# Patient Record
Sex: MALE | Race: WHITE | NOT HISPANIC OR LATINO | Employment: FULL TIME | URBAN - METROPOLITAN AREA
[De-identification: names, ages, dates, MRNs, and addresses within clinical notes are randomized per-mention and may not be internally consistent; named-entity substitution may affect disease eponyms.]

---

## 2017-01-10 ENCOUNTER — ALLSCRIPTS OFFICE VISIT (OUTPATIENT)
Dept: OTHER | Facility: OTHER | Age: 57
End: 2017-01-10

## 2017-01-10 DIAGNOSIS — M84.362A STRESS FRACTURE OF LEFT TIBIA: ICD-10-CM

## 2017-01-10 DIAGNOSIS — S93.492A SPRAIN OF OTHER LIGAMENT OF LEFT ANKLE, INITIAL ENCOUNTER: ICD-10-CM

## 2017-01-10 DIAGNOSIS — S92.042G: ICD-10-CM

## 2017-01-10 DIAGNOSIS — D75.89 OTHER SPECIFIED DISEASES OF BLOOD AND BLOOD-FORMING ORGANS: ICD-10-CM

## 2017-02-20 ENCOUNTER — GENERIC CONVERSION - ENCOUNTER (OUTPATIENT)
Dept: OTHER | Facility: OTHER | Age: 57
End: 2017-02-20

## 2017-02-20 ENCOUNTER — HOSPITAL ENCOUNTER (OUTPATIENT)
Dept: MRI IMAGING | Facility: HOSPITAL | Age: 57
Discharge: HOME | End: 2017-02-20
Attending: INTERNAL MEDICINE
Payer: OTHER MISCELLANEOUS

## 2017-02-20 DIAGNOSIS — S92.042G: ICD-10-CM

## 2017-02-20 DIAGNOSIS — M84.362A STRESS FRACTURE OF LEFT TIBIA: ICD-10-CM

## 2017-02-20 DIAGNOSIS — S93.492A SPRAIN OF OTHER LIGAMENT OF LEFT ANKLE, INITIAL ENCOUNTER: ICD-10-CM

## 2017-02-20 DIAGNOSIS — D75.89 OTHER SPECIFIED DISEASES OF BLOOD AND BLOOD-FORMING ORGANS: ICD-10-CM

## 2017-02-20 PROCEDURE — 73721 MRI JNT OF LWR EXTRE W/O DYE: CPT

## 2017-02-24 ENCOUNTER — ALLSCRIPTS OFFICE VISIT (OUTPATIENT)
Dept: OTHER | Facility: OTHER | Age: 57
End: 2017-02-24

## 2017-02-24 DIAGNOSIS — M72.2 PLANTAR FASCIAL FIBROMATOSIS: ICD-10-CM

## 2017-02-24 DIAGNOSIS — M79.672 PAIN OF LEFT FOOT: ICD-10-CM

## 2017-02-24 DIAGNOSIS — M84.362D STRESS FRACTURE OF LEFT TIBIA WITH ROUTINE HEALING: ICD-10-CM

## 2017-02-24 DIAGNOSIS — D75.89 OTHER SPECIFIED DISEASES OF BLOOD AND BLOOD-FORMING ORGANS: ICD-10-CM

## 2017-02-24 DIAGNOSIS — S93.492D SPRAIN OF OTHER LIGAMENT OF LEFT ANKLE, SUBSEQUENT ENCOUNTER: ICD-10-CM

## 2017-02-24 DIAGNOSIS — S92.042G: ICD-10-CM

## 2017-03-13 ENCOUNTER — APPOINTMENT (OUTPATIENT)
Dept: PHYSICAL THERAPY | Age: 57
End: 2017-03-13
Payer: OTHER MISCELLANEOUS

## 2017-03-13 ENCOUNTER — GENERIC CONVERSION - ENCOUNTER (OUTPATIENT)
Dept: OTHER | Facility: OTHER | Age: 57
End: 2017-03-13

## 2017-03-13 DIAGNOSIS — M79.672 PAIN OF LEFT FOOT: ICD-10-CM

## 2017-03-13 DIAGNOSIS — D75.89 OTHER SPECIFIED DISEASES OF BLOOD AND BLOOD-FORMING ORGANS: ICD-10-CM

## 2017-03-13 DIAGNOSIS — S92.042G: ICD-10-CM

## 2017-03-13 DIAGNOSIS — M72.2 PLANTAR FASCIAL FIBROMATOSIS: ICD-10-CM

## 2017-03-13 DIAGNOSIS — S93.492D SPRAIN OF OTHER LIGAMENT OF LEFT ANKLE, SUBSEQUENT ENCOUNTER: ICD-10-CM

## 2017-03-13 DIAGNOSIS — M84.362D STRESS FRACTURE OF LEFT TIBIA WITH ROUTINE HEALING: ICD-10-CM

## 2017-03-13 PROCEDURE — 97140 MANUAL THERAPY 1/> REGIONS: CPT

## 2017-03-13 PROCEDURE — 97014 ELECTRIC STIMULATION THERAPY: CPT

## 2017-03-13 PROCEDURE — 97162 PT EVAL MOD COMPLEX 30 MIN: CPT

## 2017-03-16 ENCOUNTER — APPOINTMENT (OUTPATIENT)
Dept: PHYSICAL THERAPY | Age: 57
End: 2017-03-16
Payer: OTHER MISCELLANEOUS

## 2017-03-16 PROCEDURE — 97014 ELECTRIC STIMULATION THERAPY: CPT

## 2017-03-16 PROCEDURE — 97110 THERAPEUTIC EXERCISES: CPT

## 2017-03-23 ENCOUNTER — APPOINTMENT (OUTPATIENT)
Dept: PHYSICAL THERAPY | Age: 57
End: 2017-03-23
Payer: OTHER MISCELLANEOUS

## 2017-03-24 ENCOUNTER — APPOINTMENT (OUTPATIENT)
Dept: PHYSICAL THERAPY | Age: 57
End: 2017-03-24
Payer: OTHER MISCELLANEOUS

## 2017-03-24 PROCEDURE — 97112 NEUROMUSCULAR REEDUCATION: CPT

## 2017-03-24 PROCEDURE — 97110 THERAPEUTIC EXERCISES: CPT

## 2017-03-24 PROCEDURE — 97014 ELECTRIC STIMULATION THERAPY: CPT

## 2017-03-28 ENCOUNTER — APPOINTMENT (OUTPATIENT)
Dept: PHYSICAL THERAPY | Age: 57
End: 2017-03-28
Payer: OTHER MISCELLANEOUS

## 2017-03-28 PROCEDURE — 97140 MANUAL THERAPY 1/> REGIONS: CPT

## 2017-03-28 PROCEDURE — 97110 THERAPEUTIC EXERCISES: CPT

## 2017-03-28 PROCEDURE — 97014 ELECTRIC STIMULATION THERAPY: CPT

## 2017-03-30 ENCOUNTER — APPOINTMENT (OUTPATIENT)
Dept: PHYSICAL THERAPY | Age: 57
End: 2017-03-30
Payer: OTHER MISCELLANEOUS

## 2017-03-30 PROCEDURE — 97014 ELECTRIC STIMULATION THERAPY: CPT

## 2017-03-30 PROCEDURE — 97112 NEUROMUSCULAR REEDUCATION: CPT

## 2017-03-30 PROCEDURE — 97140 MANUAL THERAPY 1/> REGIONS: CPT

## 2017-03-30 PROCEDURE — 97110 THERAPEUTIC EXERCISES: CPT

## 2017-04-04 ENCOUNTER — APPOINTMENT (OUTPATIENT)
Dept: PHYSICAL THERAPY | Age: 57
End: 2017-04-04
Payer: OTHER MISCELLANEOUS

## 2017-04-04 PROCEDURE — 97014 ELECTRIC STIMULATION THERAPY: CPT

## 2017-04-04 PROCEDURE — 97112 NEUROMUSCULAR REEDUCATION: CPT

## 2017-04-04 PROCEDURE — 97140 MANUAL THERAPY 1/> REGIONS: CPT

## 2017-04-04 PROCEDURE — 97110 THERAPEUTIC EXERCISES: CPT

## 2017-04-06 ENCOUNTER — APPOINTMENT (OUTPATIENT)
Dept: PHYSICAL THERAPY | Age: 57
End: 2017-04-06
Payer: OTHER MISCELLANEOUS

## 2017-04-06 ENCOUNTER — GENERIC CONVERSION - ENCOUNTER (OUTPATIENT)
Dept: OTHER | Facility: OTHER | Age: 57
End: 2017-04-06

## 2017-04-06 PROCEDURE — 97140 MANUAL THERAPY 1/> REGIONS: CPT

## 2017-04-06 PROCEDURE — 97110 THERAPEUTIC EXERCISES: CPT

## 2017-04-07 ENCOUNTER — ALLSCRIPTS OFFICE VISIT (OUTPATIENT)
Dept: OTHER | Facility: OTHER | Age: 57
End: 2017-04-07

## 2017-04-11 ENCOUNTER — APPOINTMENT (OUTPATIENT)
Dept: PHYSICAL THERAPY | Age: 57
End: 2017-04-11
Payer: OTHER MISCELLANEOUS

## 2017-04-11 PROCEDURE — 97110 THERAPEUTIC EXERCISES: CPT

## 2017-04-11 PROCEDURE — 97140 MANUAL THERAPY 1/> REGIONS: CPT

## 2017-04-13 ENCOUNTER — APPOINTMENT (OUTPATIENT)
Dept: PHYSICAL THERAPY | Age: 57
End: 2017-04-13
Payer: OTHER MISCELLANEOUS

## 2017-04-13 PROCEDURE — 97014 ELECTRIC STIMULATION THERAPY: CPT

## 2017-04-13 PROCEDURE — 97112 NEUROMUSCULAR REEDUCATION: CPT

## 2017-04-13 PROCEDURE — 97110 THERAPEUTIC EXERCISES: CPT

## 2017-04-18 ENCOUNTER — APPOINTMENT (OUTPATIENT)
Dept: PHYSICAL THERAPY | Age: 57
End: 2017-04-18
Payer: OTHER MISCELLANEOUS

## 2017-04-18 PROCEDURE — 97014 ELECTRIC STIMULATION THERAPY: CPT

## 2017-04-18 PROCEDURE — 97110 THERAPEUTIC EXERCISES: CPT

## 2017-04-20 ENCOUNTER — APPOINTMENT (OUTPATIENT)
Dept: PHYSICAL THERAPY | Age: 57
End: 2017-04-20
Payer: OTHER MISCELLANEOUS

## 2017-04-20 PROCEDURE — 97110 THERAPEUTIC EXERCISES: CPT

## 2017-04-20 PROCEDURE — 97014 ELECTRIC STIMULATION THERAPY: CPT

## 2017-04-20 PROCEDURE — 97112 NEUROMUSCULAR REEDUCATION: CPT

## 2017-04-25 ENCOUNTER — APPOINTMENT (OUTPATIENT)
Dept: PHYSICAL THERAPY | Age: 57
End: 2017-04-25
Payer: OTHER MISCELLANEOUS

## 2017-04-25 PROCEDURE — 97140 MANUAL THERAPY 1/> REGIONS: CPT

## 2017-04-25 PROCEDURE — 97014 ELECTRIC STIMULATION THERAPY: CPT

## 2017-04-25 PROCEDURE — 97110 THERAPEUTIC EXERCISES: CPT

## 2017-04-27 ENCOUNTER — APPOINTMENT (OUTPATIENT)
Dept: PHYSICAL THERAPY | Age: 57
End: 2017-04-27
Payer: OTHER MISCELLANEOUS

## 2017-04-27 PROCEDURE — 97112 NEUROMUSCULAR REEDUCATION: CPT

## 2017-04-27 PROCEDURE — 97014 ELECTRIC STIMULATION THERAPY: CPT

## 2017-04-27 PROCEDURE — 97110 THERAPEUTIC EXERCISES: CPT

## 2017-04-27 PROCEDURE — 97140 MANUAL THERAPY 1/> REGIONS: CPT

## 2017-05-02 ENCOUNTER — APPOINTMENT (OUTPATIENT)
Dept: PHYSICAL THERAPY | Age: 57
End: 2017-05-02
Payer: OTHER MISCELLANEOUS

## 2017-05-02 ENCOUNTER — GENERIC CONVERSION - ENCOUNTER (OUTPATIENT)
Dept: OTHER | Facility: OTHER | Age: 57
End: 2017-05-02

## 2017-05-02 PROCEDURE — 97110 THERAPEUTIC EXERCISES: CPT

## 2017-05-02 PROCEDURE — 97112 NEUROMUSCULAR REEDUCATION: CPT

## 2017-05-02 PROCEDURE — 97140 MANUAL THERAPY 1/> REGIONS: CPT

## 2017-05-04 ENCOUNTER — APPOINTMENT (OUTPATIENT)
Dept: PHYSICAL THERAPY | Age: 57
End: 2017-05-04
Payer: OTHER MISCELLANEOUS

## 2017-05-04 PROCEDURE — 97112 NEUROMUSCULAR REEDUCATION: CPT

## 2017-05-04 PROCEDURE — 97140 MANUAL THERAPY 1/> REGIONS: CPT

## 2017-05-04 PROCEDURE — 97110 THERAPEUTIC EXERCISES: CPT

## 2017-05-09 ENCOUNTER — APPOINTMENT (OUTPATIENT)
Dept: PHYSICAL THERAPY | Age: 57
End: 2017-05-09
Payer: OTHER MISCELLANEOUS

## 2017-05-11 ENCOUNTER — APPOINTMENT (OUTPATIENT)
Dept: PHYSICAL THERAPY | Age: 57
End: 2017-05-11
Payer: OTHER MISCELLANEOUS

## 2017-05-11 PROCEDURE — 97110 THERAPEUTIC EXERCISES: CPT

## 2017-06-08 ENCOUNTER — ALLSCRIPTS OFFICE VISIT (OUTPATIENT)
Dept: OTHER | Facility: OTHER | Age: 57
End: 2017-06-08

## 2017-06-08 ENCOUNTER — TRANSCRIBE ORDERS (OUTPATIENT)
Dept: ADMINISTRATIVE | Facility: HOSPITAL | Age: 57
End: 2017-06-08

## 2017-06-08 DIAGNOSIS — R20.8 BURNING SENSATION: ICD-10-CM

## 2017-06-08 DIAGNOSIS — S92.042G: Primary | ICD-10-CM

## 2017-06-14 ENCOUNTER — HOSPITAL ENCOUNTER (OUTPATIENT)
Dept: RADIOLOGY | Age: 57
Discharge: HOME | End: 2017-06-14
Payer: OTHER MISCELLANEOUS

## 2017-06-14 DIAGNOSIS — S92.042G: ICD-10-CM

## 2017-06-14 DIAGNOSIS — R20.8 BURNING SENSATION: ICD-10-CM

## 2017-06-14 PROCEDURE — 73721 MRI JNT OF LWR EXTRE W/O DYE: CPT

## 2017-07-06 ENCOUNTER — GENERIC CONVERSION - ENCOUNTER (OUTPATIENT)
Dept: OTHER | Facility: OTHER | Age: 57
End: 2017-07-06

## 2017-07-06 ENCOUNTER — ALLSCRIPTS OFFICE VISIT (OUTPATIENT)
Dept: OTHER | Facility: OTHER | Age: 57
End: 2017-07-06

## 2017-09-07 ENCOUNTER — ANESTHESIA EVENT (OUTPATIENT)
Dept: PERIOP | Facility: HOSPITAL | Age: 57
End: 2017-09-07
Payer: OTHER MISCELLANEOUS

## 2017-09-07 RX ORDER — MULTIVIT-MIN/IRON FUM/FOLIC AC 7.5 MG-4
1 TABLET ORAL DAILY
COMMUNITY

## 2017-09-07 RX ORDER — ASCORBIC ACID 500 MG
500 TABLET ORAL DAILY
COMMUNITY

## 2017-09-07 RX ORDER — ATORVASTATIN CALCIUM 10 MG/1
10 TABLET, FILM COATED ORAL DAILY
COMMUNITY

## 2017-09-08 ENCOUNTER — ANESTHESIA (OUTPATIENT)
Dept: PERIOP | Facility: HOSPITAL | Age: 57
End: 2017-09-08
Payer: OTHER MISCELLANEOUS

## 2017-09-08 ENCOUNTER — APPOINTMENT (OUTPATIENT)
Dept: RADIOLOGY | Facility: HOSPITAL | Age: 57
End: 2017-09-08
Payer: OTHER MISCELLANEOUS

## 2017-09-08 ENCOUNTER — HOSPITAL ENCOUNTER (OUTPATIENT)
Facility: HOSPITAL | Age: 57
Setting detail: OUTPATIENT SURGERY
Discharge: HOME | End: 2017-09-08
Attending: PODIATRIST | Admitting: PODIATRIST
Payer: OTHER MISCELLANEOUS

## 2017-09-08 ENCOUNTER — HOSPITAL ENCOUNTER (OUTPATIENT)
Dept: RADIOLOGY | Facility: HOSPITAL | Age: 57
Setting detail: OUTPATIENT SURGERY
Discharge: HOME | End: 2017-09-08
Payer: OTHER MISCELLANEOUS

## 2017-09-08 VITALS
HEART RATE: 73 BPM | SYSTOLIC BLOOD PRESSURE: 117 MMHG | RESPIRATION RATE: 18 BRPM | BODY MASS INDEX: 29.73 KG/M2 | DIASTOLIC BLOOD PRESSURE: 55 MMHG | TEMPERATURE: 97.6 F | OXYGEN SATURATION: 95 % | WEIGHT: 185 LBS | HEIGHT: 66 IN

## 2017-09-08 DIAGNOSIS — S92.022S: ICD-10-CM

## 2017-09-08 PROCEDURE — 73630 X-RAY EXAM OF FOOT: CPT

## 2017-09-08 PROCEDURE — 88304 TISSUE EXAM BY PATHOLOGIST: CPT | Performed by: PODIATRIST

## 2017-09-08 PROCEDURE — 73620 X-RAY EXAM OF FOOT: CPT

## 2017-09-08 PROCEDURE — 88311 DECALCIFY TISSUE: CPT | Performed by: PODIATRIST

## 2017-09-08 RX ORDER — OXYCODONE HYDROCHLORIDE AND ACETAMINOPHEN 5; 325 MG/1; MG/1
1 TABLET ORAL EVERY 4 HOURS PRN
Status: DISCONTINUED | OUTPATIENT
Start: 2017-09-08 | End: 2017-09-08 | Stop reason: HOSPADM

## 2017-09-08 RX ORDER — SODIUM CHLORIDE 9 MG/ML
125 INJECTION, SOLUTION INTRAVENOUS CONTINUOUS
Status: DISCONTINUED | OUTPATIENT
Start: 2017-09-08 | End: 2017-09-08 | Stop reason: HOSPADM

## 2017-09-08 RX ORDER — PROPOFOL 10 MG/ML
INJECTION, EMULSION INTRAVENOUS AS NEEDED
Status: DISCONTINUED | OUTPATIENT
Start: 2017-09-08 | End: 2017-09-08 | Stop reason: SURG

## 2017-09-08 RX ORDER — LIDOCAINE HYDROCHLORIDE 10 MG/ML
INJECTION, SOLUTION INFILTRATION; PERINEURAL AS NEEDED
Status: DISCONTINUED | OUTPATIENT
Start: 2017-09-08 | End: 2017-09-08 | Stop reason: SURG

## 2017-09-08 RX ORDER — FENTANYL CITRATE 50 UG/ML
INJECTION, SOLUTION INTRAMUSCULAR; INTRAVENOUS AS NEEDED
Status: DISCONTINUED | OUTPATIENT
Start: 2017-09-08 | End: 2017-09-08 | Stop reason: SURG

## 2017-09-08 RX ORDER — ONDANSETRON 2 MG/ML
4 INJECTION INTRAMUSCULAR; INTRAVENOUS ONCE AS NEEDED
Status: DISCONTINUED | OUTPATIENT
Start: 2017-09-08 | End: 2017-09-08 | Stop reason: HOSPADM

## 2017-09-08 RX ORDER — OXYCODONE HYDROCHLORIDE AND ACETAMINOPHEN 5; 325 MG/1; MG/1
1 TABLET ORAL EVERY 6 HOURS PRN
Qty: 12 TABLET | Refills: 0 | Status: SHIPPED | OUTPATIENT
Start: 2017-09-08 | End: 2017-09-18

## 2017-09-08 RX ORDER — FENTANYL CITRATE/PF 50 MCG/ML
50 SYRINGE (ML) INJECTION
Status: DISCONTINUED | OUTPATIENT
Start: 2017-09-08 | End: 2017-09-08 | Stop reason: HOSPADM

## 2017-09-08 RX ORDER — MIDAZOLAM HYDROCHLORIDE 1 MG/ML
INJECTION INTRAMUSCULAR; INTRAVENOUS AS NEEDED
Status: DISCONTINUED | OUTPATIENT
Start: 2017-09-08 | End: 2017-09-08 | Stop reason: SURG

## 2017-09-08 RX ADMIN — SODIUM CHLORIDE: 0.9 INJECTION, SOLUTION INTRAVENOUS at 13:08

## 2017-09-08 RX ADMIN — LIDOCAINE HYDROCHLORIDE 5 MG: 10 INJECTION, SOLUTION INFILTRATION; PERINEURAL at 13:14

## 2017-09-08 RX ADMIN — FENTANYL CITRATE 50 MCG: 50 INJECTION, SOLUTION INTRAMUSCULAR; INTRAVENOUS at 13:14

## 2017-09-08 RX ADMIN — MIDAZOLAM HYDROCHLORIDE 2 MG: 1 INJECTION, SOLUTION INTRAMUSCULAR; INTRAVENOUS at 13:11

## 2017-09-08 RX ADMIN — SODIUM CHLORIDE 125 ML/HR: 0.9 INJECTION, SOLUTION INTRAVENOUS at 11:52

## 2017-09-08 RX ADMIN — PROPOFOL 170 MG: 10 INJECTION, EMULSION INTRAVENOUS at 13:14

## 2017-09-08 RX ADMIN — FENTANYL CITRATE 50 MCG: 50 INJECTION INTRAMUSCULAR; INTRAVENOUS at 14:47

## 2017-09-08 RX ADMIN — CEFAZOLIN SODIUM 2000 MG: 2 SOLUTION INTRAVENOUS at 13:17

## 2017-09-08 RX ADMIN — SODIUM CHLORIDE 125 ML/HR: 0.9 INJECTION, SOLUTION INTRAVENOUS at 15:04

## 2017-11-21 ENCOUNTER — TRANSCRIBE ORDERS (OUTPATIENT)
Dept: ADMINISTRATIVE | Facility: HOSPITAL | Age: 57
End: 2017-11-21

## 2017-11-21 ENCOUNTER — HOSPITAL ENCOUNTER (OUTPATIENT)
Dept: ULTRASOUND IMAGING | Facility: HOSPITAL | Age: 57
Discharge: HOME | End: 2017-11-21
Attending: PODIATRIST
Payer: OTHER MISCELLANEOUS

## 2017-11-21 PROCEDURE — 93971 EXTREMITY STUDY: CPT

## 2017-12-12 ENCOUNTER — ALLSCRIPTS OFFICE VISIT (OUTPATIENT)
Dept: OTHER | Facility: OTHER | Age: 57
End: 2017-12-12

## 2017-12-12 DIAGNOSIS — M77.12 LATERAL EPICONDYLITIS OF LEFT ELBOW: ICD-10-CM

## 2017-12-12 DIAGNOSIS — R20.0 ANESTHESIA OF SKIN: ICD-10-CM

## 2017-12-12 DIAGNOSIS — R20.2 PARESTHESIA OF SKIN: ICD-10-CM

## 2017-12-12 DIAGNOSIS — R20.8 OTHER DISTURBANCES OF SKIN SENSATION: ICD-10-CM

## 2017-12-27 ENCOUNTER — GENERIC CONVERSION - ENCOUNTER (OUTPATIENT)
Dept: OTHER | Facility: OTHER | Age: 57
End: 2017-12-27

## 2017-12-27 ENCOUNTER — APPOINTMENT (OUTPATIENT)
Dept: PHYSICAL THERAPY | Age: 57
End: 2017-12-27
Payer: OTHER MISCELLANEOUS

## 2017-12-27 DIAGNOSIS — R20.8 OTHER DISTURBANCES OF SKIN SENSATION: ICD-10-CM

## 2017-12-27 DIAGNOSIS — R20.0 ANESTHESIA OF SKIN: ICD-10-CM

## 2017-12-27 DIAGNOSIS — R20.2 PARESTHESIA OF SKIN: ICD-10-CM

## 2017-12-27 DIAGNOSIS — M77.12 LATERAL EPICONDYLITIS OF LEFT ELBOW: ICD-10-CM

## 2017-12-27 PROCEDURE — G8978 MOBILITY CURRENT STATUS: HCPCS

## 2017-12-27 PROCEDURE — G8979 MOBILITY GOAL STATUS: HCPCS

## 2017-12-27 PROCEDURE — 97162 PT EVAL MOD COMPLEX 30 MIN: CPT

## 2017-12-28 ENCOUNTER — APPOINTMENT (OUTPATIENT)
Dept: PHYSICAL THERAPY | Age: 57
End: 2017-12-28
Payer: OTHER MISCELLANEOUS

## 2017-12-28 PROCEDURE — 97140 MANUAL THERAPY 1/> REGIONS: CPT

## 2017-12-28 PROCEDURE — 97110 THERAPEUTIC EXERCISES: CPT

## 2018-01-02 ENCOUNTER — APPOINTMENT (OUTPATIENT)
Dept: PHYSICAL THERAPY | Age: 58
End: 2018-01-02
Payer: OTHER MISCELLANEOUS

## 2018-01-02 PROCEDURE — 97112 NEUROMUSCULAR REEDUCATION: CPT

## 2018-01-02 PROCEDURE — 97140 MANUAL THERAPY 1/> REGIONS: CPT

## 2018-01-02 PROCEDURE — 97110 THERAPEUTIC EXERCISES: CPT

## 2018-01-04 ENCOUNTER — APPOINTMENT (OUTPATIENT)
Dept: PHYSICAL THERAPY | Age: 58
End: 2018-01-04
Payer: OTHER MISCELLANEOUS

## 2018-01-08 ENCOUNTER — APPOINTMENT (OUTPATIENT)
Dept: PHYSICAL THERAPY | Age: 58
End: 2018-01-08
Payer: OTHER MISCELLANEOUS

## 2018-01-08 PROCEDURE — 97110 THERAPEUTIC EXERCISES: CPT

## 2018-01-08 PROCEDURE — 97140 MANUAL THERAPY 1/> REGIONS: CPT

## 2018-01-10 NOTE — RESULT NOTES
Verified Results  * MRI ANKLE/HEEL LEFT  WO CONTRAST [id number] 09:54AM Jimmy Vanessa Order Number: [id number]   Performing Comments: 56y/o male with left calcaneal and cuboid fractyure with associated left talar dome osteochondral defect since 7/2016 with persistent symptom s/p conservative management  - Patient Instructions: To schedule this appointment, please    contact Central Scheduling at [phone]  Stopover 11/4 [email or web address]     Test Name Result Flag Reference   MRI ANKLE/HEEL LEFT  WO CONTRAST (Report)     MRI LEFT ANKLE - WITHOUT CONTRAST     INDICATION: dx: closed fx of left calcaneous, displaced fx of cuboid bone, disorder of bone, disorder of cartilage      56y/o male with left calcaneal and cuboid fractuure with associated left talar dome osteochondral defect since 7/2016 with persistent    symptom s/p conservative management  COMPARISON: Left foot plain films from 9/21/2016 and left foot and ankle CT from 8/1/2016  TECHNIQUE:  MR sequences were obtained of the left ankle including: Localizers, coronal STIR, coronal T1, axial T2 fat sat, axial PD, sagittal T2 fat sat, sagittal T1 sequences were obtained  Images were acquired on a1 5 [facility] Unit  Gadolinium was not    used  FINDINGS:     SUBCUTANEOUS TISSUES: Normal     JOINT EFFUSION: None  TENDONS:   Achilles tendon: Normal    Peroneus brevis and longus: Normal    Posterior tibialis, flexor digitorum longus, flexor hallucis longus: Normal    Anterior tibialis, extensor digitorum longus, extensor hallucis longus: Normal      LIGAMENTS:   Lateral collateral ligament complex: Normal    Distal tibiofibular syndesmosis: The anterior inferior tibiofibular ligament is torn (series 2 image 11 through 14 ) The interosseous membrane and the posterior inferior tibiofibular ligament are intact  Medial collateral ligament complex: Normal      PLANTAR FASCIA: Normal      ARTICULAR SURFACE: Intact       SINUS TARSI: Normal      Tarsal Tunnel: Unremarkable  BONE MARROW SIGNAL: There is a stress fracture line in the anterior lateral aspect of the distal tibial metaphysis, with surrounding marrow edema  (Series 7 images 7 through 12 )     Again seen is an anterior calcaneus fracture adjacent to the calcaneocuboid joint  There is some bony bridging formed, indicating healing  (Series 3 images 24 through 88 and series 2 images 24 through 28 )     There is additional juxta-articular marrow edema throughout the midfoot, which could represent complex regional pain syndrome  (Series 7 images 8 through 18 )     Previously seen small lateral cuneiform and cuboid fracture fragment on CT are not well visualized on MRI  MUSCULATURE: Normal        IMPRESSION:     There is a healing calcaneal fracture adjacent to the calcaneocuboid joint (Series 3 images 24 through 88 and series 2 images 24 through 28 )     There is a stress fracture line in the anterior lateral aspect of the distal tibial metaphysis  (Series 7 images 7 through 12 )     There is additional juxta-articular marrow edema throughout the midfoot, which could represent complex regional pain syndrome   (Series 7 images 8 through 18 )     The anterior inferior tibiofibular ligament is torn (series 2 image 11 through 14 )        ##sigslh##sigslh            Workstation performed: [id number]     Signed by:   Asihsh Gordon MD   11/4/16

## 2018-01-11 ENCOUNTER — APPOINTMENT (OUTPATIENT)
Dept: PHYSICAL THERAPY | Age: 58
End: 2018-01-11
Payer: OTHER MISCELLANEOUS

## 2018-01-11 PROCEDURE — 97140 MANUAL THERAPY 1/> REGIONS: CPT

## 2018-01-11 PROCEDURE — 97110 THERAPEUTIC EXERCISES: CPT

## 2018-01-11 PROCEDURE — 97112 NEUROMUSCULAR REEDUCATION: CPT

## 2018-01-12 VITALS
HEART RATE: 86 BPM | SYSTOLIC BLOOD PRESSURE: 132 MMHG | DIASTOLIC BLOOD PRESSURE: 83 MMHG | WEIGHT: 177.91 LBS | HEIGHT: 66 IN | BODY MASS INDEX: 28.59 KG/M2

## 2018-01-13 VITALS
WEIGHT: 177.03 LBS | HEIGHT: 66 IN | BODY MASS INDEX: 28.45 KG/M2 | DIASTOLIC BLOOD PRESSURE: 80 MMHG | SYSTOLIC BLOOD PRESSURE: 139 MMHG | HEART RATE: 85 BPM

## 2018-01-13 VITALS
SYSTOLIC BLOOD PRESSURE: 146 MMHG | WEIGHT: 175.04 LBS | HEART RATE: 88 BPM | BODY MASS INDEX: 28.13 KG/M2 | HEIGHT: 66 IN | DIASTOLIC BLOOD PRESSURE: 88 MMHG

## 2018-01-14 VITALS — BODY MASS INDEX: 28.13 KG/M2 | WEIGHT: 175.04 LBS | HEIGHT: 66 IN

## 2018-01-14 VITALS
SYSTOLIC BLOOD PRESSURE: 139 MMHG | BODY MASS INDEX: 28.59 KG/M2 | HEART RATE: 82 BPM | HEIGHT: 66 IN | DIASTOLIC BLOOD PRESSURE: 84 MMHG | WEIGHT: 177.91 LBS

## 2018-01-14 NOTE — RESULT NOTES
Verified Results  * MRI ANKLE/HEEL LEFT  WO CONTRAST [id number] 10:54AM Jerilyn Plain Order Number: [id number]   Performing Comments: Left calcaneal and distal tibia ankle fracture, ATFL tear, and diffuse juxta-articular mid foot bone marrow edema follow-up   - Patient Instructions: To schedule this appointment, please contact Central Scheduling at [phone]  PT TO USE ONE CALL FOR   APPROVED SECONDARY CBC [phone]     Test Name Result Flag Reference   MRI ANKLE/HEEL LEFT  WO CONTRAST (Report)     This is a summary report  The complete report is available in the patient's medical record  If you cannot access the medical record, please contact the sending organization for a detailed fax or copy  MRI LEFT ANKLE - WITHOUT CONTRAST     INDICATION: Follow-up exam, prior injury and prior MRI     COMPARISON: Left ankle MRI 11/4/2016     TECHNIQUE:  MR sequences were obtained of the left ankle including: Localizers, coronal STIR, coronal T1, axial T2 fat sat, axial PD, sagittal T2 fat sat, sagittal T1 sequences were obtained  Images were acquired on a1 5 [facility] Unit  Gadolinium was not    used  FINDINGS:     SUBCUTANEOUS TISSUES: Normal     JOINT EFFUSION: Trace tibiotalar joint fluid        TENDONS:   Achilles tendon: Normal    Peroneus brevis and longus: Normal    Posterior tibialis, flexor digitorum longus, flexor hallucis longus: Normal    Anterior tibialis, extensor digitorum longus, extensor hallucis longus: Normal      LIGAMENTS:   Lateral collateral ligament complex: Normal    Distal tibiofibular syndesmosis: Normal    Medial collateral ligament complex: Intact     PLANTAR FASCIA: Mild thickening of the medial band plantar fascia near the calcaneal insertion  ARTICULAR SURFACE: Intact  SINUS TARSI: Normal      Tarsal Tunnel: Unremarkable  BONE MARROW SIGNAL: There is residual marrow edema at the anterior lateral aspect of the calcaneus at site of prior stress fracture  There has been interval resolution of the edema within the anterior lateral aspect of the distal tibial metaphysis  There has been interval resolution of the juxtaarticular edema throughout the midfoot  MUSCULATURE: Normal        IMPRESSION:   1  There is faint residual marrow edema within the anterior lateral calcaneus adjacent to the calcaneocuboid joint compatible with healing anterior calcaneal fracture  2  Interval resolution of the edema within the anterior lateral aspect of the distal tibia  3  Interval resolution of the juxtaarticular edema throughout the midfoot         Workstation performed: [id number]     Signed by:   Steph Prieto MD   2/20/17

## 2018-01-15 ENCOUNTER — APPOINTMENT (OUTPATIENT)
Dept: PHYSICAL THERAPY | Age: 58
End: 2018-01-15
Payer: OTHER MISCELLANEOUS

## 2018-01-15 PROCEDURE — 97110 THERAPEUTIC EXERCISES: CPT

## 2018-01-15 PROCEDURE — 97140 MANUAL THERAPY 1/> REGIONS: CPT

## 2018-01-15 NOTE — RESULT NOTES
Verified Results  * MRI ANKLE/HEEL LEFT  WO CONTRAST [id number] 07:47AM Norm Loveless     Test Name Result Flag Reference   MRI ANKLE/HEEL LEFT  WO CONTRAST (Report)     This is a summary report  The complete report is available in the patient's medical record  If you cannot access the medical record, please contact the sending organization for a detailed fax or copy  MRI LEFT ANKLE - WITHOUT CONTRAST     INDICATION: [id number] [id number]: Displaced other fracture of tuberosity of left calcaneus, subsequent encounter for fracture with delayed healing   R20 8: Other disturbances of skin sensation  History taken directly from the electronic ordering system  Left calcaneus fracture  Distal tibia fracture  Cuboid fracture  COMPARISON: MRI dated 2/20/2017  TECHNIQUE:  MR sequences were obtained of the left ankle including: Localizers, coronal STIR, coronal T1, axial T2 fat sat, axial PD, sagittal T2 fat sat, sagittal T1 sequences were obtained  Images were acquired on a1 5 [facility] Unit  Gadolinium was not    used  FINDINGS:     SUBCUTANEOUS TISSUES: Normal     JOINT EFFUSION: None  TENDONS:   Achilles tendon: Normal    Peroneus brevis and longus: Normal    Posterior tibialis, flexor digitorum longus, flexor hallucis longus: Normal    Anterior tibialis, extensor digitorum longus, extensor hallucis longus: Normal      LIGAMENTS:   Lateral collateral ligament complex: Normal    Distal tibiofibular syndesmosis: Normal    Medial collateral ligament complex: Normal      PLANTAR FASCIA: Normal      ARTICULAR SURFACES: Intact  SINUS TARSI: Normal      Tarsal Tunnel: Unremarkable  BONE MARROW SIGNAL: Mildly displaced distal calcaneus intra-articular fracture mild residual marrow edema  No further fractures identified  No stress response identified  MUSCULATURE: Normal        IMPRESSION:   1  Stable mildly displaced distal calcaneal intra-articular fracture  Mild residual marrow edema identified     2  No further bone marrow edema to suggest stress response or additional fracture  3  No significant ligamentous or tendinous injury         Workstation performed: [id number]     Signed by:   Almas Hernandez MD   6/14/17

## 2018-01-18 ENCOUNTER — APPOINTMENT (OUTPATIENT)
Dept: PHYSICAL THERAPY | Age: 58
End: 2018-01-18
Payer: OTHER MISCELLANEOUS

## 2018-01-18 PROCEDURE — 97110 THERAPEUTIC EXERCISES: CPT

## 2018-01-18 PROCEDURE — 97140 MANUAL THERAPY 1/> REGIONS: CPT

## 2018-01-22 ENCOUNTER — APPOINTMENT (OUTPATIENT)
Dept: PHYSICAL THERAPY | Age: 58
End: 2018-01-22
Payer: OTHER MISCELLANEOUS

## 2018-01-22 VITALS
BODY MASS INDEX: 29.09 KG/M2 | DIASTOLIC BLOOD PRESSURE: 82 MMHG | WEIGHT: 181 LBS | HEART RATE: 82 BPM | HEIGHT: 66 IN | SYSTOLIC BLOOD PRESSURE: 144 MMHG

## 2018-01-22 PROCEDURE — G8979 MOBILITY GOAL STATUS: HCPCS

## 2018-01-22 PROCEDURE — 97140 MANUAL THERAPY 1/> REGIONS: CPT

## 2018-01-22 PROCEDURE — 97110 THERAPEUTIC EXERCISES: CPT

## 2018-01-22 PROCEDURE — G8978 MOBILITY CURRENT STATUS: HCPCS

## 2018-01-23 ENCOUNTER — ALLSCRIPTS OFFICE VISIT (OUTPATIENT)
Dept: OTHER | Facility: OTHER | Age: 58
End: 2018-01-23

## 2018-01-23 NOTE — MISCELLANEOUS
Message  Return to work or school:   Sheri Carmona is under my professional care  He was seen in my office on 12/12/2017       Okay to Resume work and physical activities without any restrictions from his left upper extremity standpoint  Citlalli CHAUDHRY        Signatures   Electronically signed by : Citlalli Cunha MD; Jan 9 2018  7:05PM EST                       (Author)

## 2018-01-24 NOTE — PROGRESS NOTES
Assessment   1  Left lateral epicondylitis (726 32) (M77 12)1      1 Amended By: Tigist Fails; Jan 23 2018 6:29 PM EST      Plan    · Follow-up PRN Evaluation and Treatment  Follow-up  Status: Complete  Done:    [id number] 06:30PM1      1 Amended By: Tigist Fails; Jan 23 2018 6:30 PM EST      Discussion/Summary      I discussed, in great detail, with Mr  Padma Garcia and his accompanying wife that on today's physical exam he shows significant improvement in wrist extensor strength and range of motion as compared to previous visit  He is instructed to continue with formal physical therapy in regards to the lateral epicondylitis until he reaches full resolution  He is also instructed to continue daily home exercise program for his lateral epicondylitis as well as his left rotator cuff muscles and incorporated into his lifestyle routine to prevent or minimize symptom exacerbation  I recommended transitioning him out of the wrist brace and into a tennis elbow strap today which he will start using for his daily activities for residual symptom control  However, when the splint was presented to him, he declined it  He will be seen on an as-needed basis in regards to this issue  In regards to his complaints concerning numbness /tingling of the lower leg and foot, it was explained very specifically, that the extent of evaluation workup performed by this office has only been in regards to his past foot fracture, and most recently his lateral elbow pain  We explained that if he is having left foot pain, numbness, and swelling localized to the lateral aspect of the left foot, that these issue should be directed to the surgeon who performed his procedure; because it is common to have sometimes experience postsurgical dysthesia symptoms following surgery, and that the symptoms or more appropriately managed by the doctor who performed said procedure   If his pain includes more of the lower extremity, then this would indicate a separate complaint then has been under the care of this office up to this point  This issue would need separate, additional evaluation and workup, which can be done by this office or any other physician of his choice to rule out lumbosacral radiculopathy or any other etiology  It still seems that his complaints are multifactoral  His left lateral epicondylitis cannot definitively be linked to his ankle fracture or his reported leg and thigh swelling which he reportedly had negative doppler ultrasound for DVT  Again, I recommend that he can schedule an appointment with any physician for his lower extremity radiating numbness from his thigh/buttock to the foot or he can see me if he prefers to do so   regards to the focal dorsolateral foot numbness which is primarily in the lateral aspect of his ene as well as his recurrent left thigh/leg swelling, he should contact his surgeon for further evaluation of these complaints because he did not have it when he was under my care  Chief Complaint   1  Arm Pain  Left elbow pain foot pain      History of Present Illness   Hip Problem: The patient is being seen for follow-up of a hip problem  Foot Problem: The patient is being seen for a routine clinic follow-up of a foot problem  Symptoms:  pain-- and-- swelling  HPI: Mr Jerome Alexander is a 53-year-old RHD male who presents today for follow-up evaluation for left lateral epicondylitis  In regards to his elbow pain, he reports that he has seen significant improvement with formal physical therapy  He reports that he feels much stronger and is able to  things without pain  He does not have pain with basic wrist flexion and extension motions  He denies any neck pain, bruising, swelling, numbness, tingling   He does note a very localized point of pain superior to the lateral epicondyle    regards to his left lower limb pain, he reports he is still having lateral left foot numbness/tingling as well as pain that travels through his left leg and into his foot  The left leg pain and tingling radiates from his thigh to up and down his left leg especially with sitting down and prolonged standing  left foot numbness is in the dorsolateral aspect of his left foot  He reports that on Saturday 1/20/2017 he was playing in his yd with his grandkids for approximately 1 hour, after which time he experienced significant pain that made him unable to function around the house for the remainder of the day  The following morning he woke up with swelling and small bumps on the dorsal aspect of his foot  He reports that he has not been consistent with taking the gabapentin as originally prescribed at last visit, because his pharmacy did not received a fax for his prescription until 5 days ago  also reports today that he had an episode of left leg and thigh swelling in November, 2017 in which lower extremity Doppler was done and it was found to be negative  He states that he has been having recurrent left thigh and leg swelling  Review of Systems        Constitutional: No fever or chills, feels well, no tiredness, no recent weight loss or weight gain  Eyes: No complaints of red eyes, no eyesight problems  ENT: no complaints of loss of hearing, no nosebleeds, no sore throat  Cardiovascular: No complaints of chest pain, no palpitations, no leg claudication or lower extremity edema  Respiratory: No complaints of shortness of breath, no wheezing, no cough  Gastrointestinal: No complaints of abdominal pain, no constipation, no nausea or vomiting, no diarrhea or bloody stools  Genitourinary: No complaints of dysuria or incontinence, no hesitancy, no nocturia  Musculoskeletal: as noted in HPI  Integumentary: No complaints of skin rash or lesion, no itching or dry skin, no skin wounds  Neurological: No complaints of headache, no confusion, no numbness or tingling, no dizziness        Psychiatric: No suicidal thoughts, no anxiety, no depression  Endocrine: No muscle weakness, no frequent urination, no excessive thirst, no feelings of weakness  ROS reviewed  Active Problems   1  Bone marrow edema (289 89) (D75 89)  2  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 (733 82)     (X44 021Q)  3  Current every day smoker (305 1) (F17 200)  4  Fracture of tuberosity of left calcaneus with delayed healing, unspecified fracture     morphology, subsequent encounter  5  Left arm numbness (782 0) (R20 0)  6  Left cuboid fracture (825 23) (U39 365I)  7  Left foot pain (729 5) (M79 672)  8  Left lateral epicondylitis (726 32) (M77 12)  9  Numbness and tingling of left leg (782 0) (R20 0,R20 2)  10  Numbness of left foot (782 0) (R20 8)  11  Osteochondral lesion of talar dome (733 90) (M89 9,M94 9)  12  Peroneal tendonitis of left lower extremity (726 79) (M76 72)  13  Plantar fasciitis of left foot (728 71) (M72 2)  14  Stress fracture of left tibia with routine healing (V54 89) (M84 362D)  15  Tear of talofibular ligament of left lower extremity, subsequent encounter (V58 89,845 09)      (S93 492D)  16  Tibialis posterior tendonitis, left (726 72) (G17 732)    Past Medical History    · History of deep venous thrombosis (V12 51) (Z86 718)     The active problems and past medical history were reviewed and updated today  Surgical History    · History of Hernia Repair     The surgical history was reviewed and updated today  Family History   Mother    · Family history of malignant neoplasm (V16 9) (Z80 9)  Father    · Family history of malignant neoplasm (V16 9) (Z80 9)     The family history was reviewed and updated today  Social History    · Current every day smoker (305 1) (F17 200)   · Current every day smoker (305 1) (F17 200)   · Drinks coffee   · No alcohol use  The social history was reviewed and updated today  The social history was reviewed and is unchanged  Current Meds   1  Crestor 5 MG Oral Tablet Recorded  2  Dexamethasone Sodium Phosphate 4 MG/ML Injection Solution; To be used for     dexamethasone iontophoresis for tendinitis/tendinopathy during physical therapy     sessions; Therapy: 55RXR4162 to (Last Rx:07Apr2017)  Requested for: 07Apr2017 Ordered  3  Gabapentin 300 MG Oral Capsule; TAKE 1 CAPSULE Bedtime for 5 days  If tolerated     well, increased to one tab by mouth 3 times a day; Therapy: 80QHG9671 to (Last Rx:86Guj0790) Ordered  4  Naproxen 500 MG Oral Tablet; TAKE 1 TABLET EVERY 12 HOURS AS NEEDED; Therapy: 35RSS1900 to (Danielle Abreu)  Requested for: 07Apr2017; Last     Rx:07Apr2017 Ordered  5  Naproxen 500 MG Oral Tablet; TAKE 1 TABLET Every twelve hours After Meals x7 days,     then 1 tab every 12hrs PRN for pain; Therapy: 50YXM7405 to (Evaluate:27Jan2018); Last Rx:37Qfd5661 Ordered  6  PredniSONE 20 MG Oral Tablet; TAKE 1 TABLET DAILY x5 days then 1/2 tab daily x2     days; Therapy: 63OLQ6449 to (Last Rx:26Afa8834) Ordered  7  Vitamin E 1000 UNIT Oral Capsule Recorded     The medication list was reviewed and updated today  Allergies   1  No Known Drug Allergies  2  No Known Latex Allergies    Vitals    Recorded: 08SNT2521 08:22AM   Heart Rate 76   Systolic 113   Diastolic 86   Height 5 ft 5 98 in   Weight 179 lb 0 19 oz   BMI Calculated 28 91   BSA Calculated 1 92     Physical Exam      Left Elbow: Appearance: Normal except  (No obvious deformity noted  Mildly TTP at brachioradialis origin and distal triceps  Full active and passive ROM as compared to contralateral extremity  5/5 MMT throughout  +   Resisted wrist extension at [facility] and at 90Â°, +  resisted middle finger extension, -  Phalen's, -  Zain's)      (No obvious deformity noted  No swelling noted  No   No changes in skin texture noted     TTP over sinus tarsi I )           Constitutional - General appearance: Normal       Musculoskeletal - Gait and station: Normal -- Digits and nails: Normal -- Muscle strength/tone: Normal       Cardiovascular - Pulses: Normal -- Examination of extremities for edema and/or varicosities: Normal       Skin - Skin and subcutaneous tissue: Normal       Neurologic - Sensation: Normal       Psychiatric - Orientation to person, place, and time: Normal -- Mood and affect: Normal       Eyes      Conjunctiva and lids: Normal        Pupils and irises: Normal        Attending Note   Scribe Attestation:      Scribe Attestation [place], LAT, ATC am acting as a scribe in the presence of the attending physician while the attending physician examines the patient  Physician Attestation:      End of Encounter Meds   1  Naproxen 500 MG Oral Tablet; TAKE 1 TABLET Every twelve hours After Meals x7 days,     then 1 tab every 12hrs PRN for pain; Therapy: 10OCG2174 to (Evaluate:27Jan2018); Last Rx:98Ugj3721 Ordered  2  Gabapentin 300 MG Oral Capsule; TAKE 1 CAPSULE Bedtime for 5 days  If tolerated     well, increased to one tab by mouth 3 times a day; Therapy: 76ZJY5390 to (Last Rx:28Dec2017) Ordered  3  PredniSONE 20 MG Oral Tablet; TAKE 1 TABLET DAILY x5 days then 1/2 tab daily x2     days; Therapy: 63APR5672 to (Last Rx:28Dec2017) Ordered  4  Dexamethasone Sodium Phosphate 4 MG/ML Injection Solution; To be used for     dexamethasone iontophoresis for tendinitis/tendinopathy during physical therapy     sessions; Therapy: 12EZS2344 to (Last Rx:07Apr2017)  Requested for: 07Apr2017 Ordered  5  Naproxen 500 MG Oral Tablet; TAKE 1 TABLET EVERY 12 HOURS AS NEEDED; Therapy: 65DGM4780 to (Elliot Ledbetter)  Requested for: 07Apr2017; Last     Rx:07Apr2017 Ordered  6  Crestor 5 MG Oral Tablet (Rosuvastatin Calcium) Recorded  7   Vitamin E 1000 UNIT Oral Capsule Recorded    Signatures    Electronically signed by : Anabell Jovel MD; Jan 23 2018  6:30PM EST                       (Author)

## 2018-01-25 ENCOUNTER — OFFICE VISIT (OUTPATIENT)
Dept: PHYSICAL THERAPY | Age: 58
End: 2018-01-25
Payer: OTHER MISCELLANEOUS

## 2018-01-25 DIAGNOSIS — R20.2 PARESTHESIA OF LEFT LEG: Primary | ICD-10-CM

## 2018-01-25 DIAGNOSIS — R20.8 OTHER DISTURBANCES OF SKIN SENSATION: ICD-10-CM

## 2018-01-25 DIAGNOSIS — M77.12 LATERAL EPICONDYLITIS, LEFT ELBOW: ICD-10-CM

## 2018-01-25 DIAGNOSIS — R20.0 ANESTHESIA OF SKIN: ICD-10-CM

## 2018-01-25 PROCEDURE — G8979 MOBILITY GOAL STATUS: HCPCS | Performed by: PHYSICAL THERAPIST

## 2018-01-25 PROCEDURE — 97164 PT RE-EVAL EST PLAN CARE: CPT | Performed by: PHYSICAL THERAPIST

## 2018-01-25 PROCEDURE — G8978 MOBILITY CURRENT STATUS: HCPCS | Performed by: PHYSICAL THERAPIST

## 2018-01-25 RX ORDER — GABAPENTIN 300 MG/1
100 CAPSULE ORAL DAILY
COMMUNITY
End: 2018-05-01

## 2018-01-25 NOTE — PROGRESS NOTES
PT Re-Evaluation     Today's date: 2018  Patient name: Daniel Dalton  : 1960  MRN: [id number]  Referring provider: Isidro Barcenas MD  Dx:   Encounter Diagnoses   Name Primary?  Lateral epicondylitis, left elbow Yes    Anesthesia of skin     Paresthesia of left leg     Other disturbances of skin sensation                   Assessment  Impairments: abnormal gait, abnormal or restricted ROM, impaired physical strength and weight-bearing intolerance    Assessment details: Patient reported long standing dysfunction on left le that still persists after fall at work  Patient noted left le has constant pain and numbness in lower leg distal and prolonged weight baring creates entire left le paraesthesias and pain and left ankle foot become edematous  Patient noted he unable to tolerate wearing his left work boot that is a high top boot that is steel tipped and steel shank  Patient noted left le remains weak as well  Patient noted he can tolerate only 1 5 hours of weight baring functional activities  Patient noted walking, stair climbing remain limited by left le symptoms and impairments  Patient noted prolonged sitting especially on an elevated table or chair aggravates his left le paraesthesia presentation  Patient noted left elbow overall is improved and only minimal pain to touch at left lateral epicondyle region at insertion of wrist extensor tendons  Understanding of Dx/Px/POC: good  Goals  ST) left le pain reduction to 0 of 10 at rest and 6 of 10 at worst   2) increase left le and elbow strength by 1/2 grade  3) increase left le range of motion by 5-10 degrees  4) independent home exercise program   LT) left le pain reduction to 0 of 10 at rest and 4 of 10 at worst and elimination of left lateral elbow pain  2) increase left le by 1 grade and elbow strength to full 5/5   3) increase left le range of motion to equal right le range of motion    4) standing tolerance increase to 3 to 4 hours  5) decrease TUG to 10 sec to improve gait status  6) improve mCTSIB by  25 to   50 to reduce risk of falling  7) improve left lower extremity foto score to 50 and left upper extremity score to 46  Plan    Planned modality interventions: TENS, ultrasound, unattended electrical stimulation, H-Wave, thermotherapy: hydrocollator packs and hydrotherapy  Planned therapy interventions: manual therapy, motor coordination training, neuromuscular re-education, patient education, aquatic therapy, body mechanics training, balance/weight bearing training, compression, strengthening, stretching, flexibility, therapeutic activities, therapeutic exercise, gait training, graded exercise and home exercise program  Frequency: 2x week  Duration in weeks: 8  Treatment plan discussed with: patient        Subjective Evaluation    History of Present Illness  Mechanism of injury: Patient noted date of injury in 2016 of left le while injury at work  Patient noted left elbow symptoms occurred during work injury as well while left elbow symptoms increased over time as well  Patient reported left lateral elbow pain is at 0 of 10 and worst at 2 to 3 of 10  Patient noted left le pain is least at 2 of 10 and 8 of 10 at worst   Patient noted left distal le paraesthesias persist constantly below the knee and above the knee with prolonged weight baring activities that also increase his edema in left ankle and foot  Pain  Pain location: Left lower extremity    Quality: burning, throbbing and needle-like  Aggravating factors: sitting, standing, stair climbing, walking and lifting  Progression: no change    Social Support  Steps to enter house: yes  8  Stairs in house: yes   14  Lives in: multiple-level home  Lives with: spouse    Hand dominance: right    Treatments  Previous treatment: medication and physical therapy  Current treatment: physical therapy  Patient Goals  Patient goals for therapy: decreased edema, increased strength, decreased pain, increased motion and return to work          Objective     Palpation     Additional Palpation Details  Patient was + for pain during palpation at minimal levels at medial and lateral malleolar region as well as moderate at plantar fascia region  Tenderness     Left Elbow   Tenderness in the lateral epicondyle  Left Wrist/Hand   Tenderness in the lateral epicondyle  Additional Tenderness Details  Patient noted moderate tender to palpation at left lateral epicondyle and and common wrist extensor tendon region  Active Range of Motion     Left Elbow   Flexion: 135 degrees   Extension: Left elbow active extension: - 2  Forearm supination: 80 degrees   Forearm pronation: 88 degrees     Right Elbow   Flexion: 138 degrees   Extension: Right elbow active extension: - 5     Forearm supination: 85 degrees   Forearm pronation: 86 degrees   Left Ankle/Foot   Dorsiflexion (ke): 4 degrees with pain  Plantar flexion: 35 degrees   Inversion: 25 degrees   Eversion: 8 degrees     Right Ankle/Foot   Dorsiflexion (ke): 5 degrees   Plantar flexion: 55 degrees   Inversion: 25 degrees   Eversion: 10 degrees     Strength/Myotome Testing     Left Elbow   Flexion: 5  Extension: 4+  Forearm supination: 5  Forearm pronation: 5    Right Elbow   Flexion: 5  Extension: 4+  Forearm supination: 5  Forearm pronation: 5    Left Hip   Planes of Motion   Flexion: 4-  Extension: 4-  Abduction: 4-  Adduction: 4    Right Hip   Planes of Motion   Flexion: 5  Extension: 5  Abduction: 5  Adduction: 5    Left Knee   Flexion: 4  Extension: 4-    Right Knee   Flexion: 5  Prone flexion: 5    Left Ankle/Foot   Dorsiflexion: 4-  Plantar flexion: 4+  Inversion: 4  Eversion: 4-    Right Ankle/Foot   Dorsiflexion: 5  Plantar flexion: 5  Inversion: 5  Eversion: 4+    Swelling   Left Ankle/Foot   Metatarsal heads: 24 9 cm  Figure 8: 54 cm  Malleoli: 25 9 cm    Right Ankle/Foot   Metatarsal heads: 24 7 cm  Figure 8: 53 8 cm  Malleoli: 25 2 cm    Ambulation     Ambulation: Level Surfaces   Ambulation without assistive device: independent    Additional Level Surfaces Ambulation Details  Patient ambulates with decrease in pace, increase in base of support, decrease in left le push off with stance phase, and slight bias towards right sided weight baring  TUG is at 12 32 sec  Ambulation: Stairs   Ascend stairs: independent  Pattern: reciprocal  Descend stairs: independent  Pattern: reciprocal    Additional Stairs Ambulation Details  Patient performs stairs with unilateral railing with ascent and descent with decrease in pace with reciprocal pattern with ascent and decrease in left le push off with ascent and decrease in left stance with stair descent due to weight baring reduction with left le weight baring and stair descent increase in base of support and hip in ER and again bias towards right sided weight baring  Comments   MCTSIB: Eyes Open on Firm Surface with patient sway index at 1 56; Eyes Closed on Firm Surface with patient sway index at 1 34; Eyes Open on Foam Surface with patient sway index at 1 36; Eyes Closed on Foam Surface with patient sway index at 1 58  Precautions: DVT History, Fall History      Daily Treatment Diary     Manual              Retrograde massage to left ankle and foot                                                                     Exercise Diary              Bike             LAQ:B:             Hip flexion:B:             Hamstring stretch:B:             Gastroc stretch with strap:B:             Ankle arom:L             Ankle tband: red: L             Standing slr x 3:B:             Mini squats:             Lunges:B:             SLS:B:             Tandem Stance:B:             Side stepping:B             Tandem ambulation:             [employer] Balance system: limits of stability: level             Step ups:B:             Step downs:B:                                                        Modalities TENS and MHP to left ankle and foot  Patient d/c to hep due to changing doctors

## 2018-01-25 NOTE — PROGRESS NOTES
I have reviewed the notes, assessments, and/or procedures performed by Emili Martinez PT, I concur with her/his documentation of Mingo Julien with the following exceptions: Patient's elbow flexion/extension strength was within normal limits during our last encounter

## 2018-01-25 NOTE — LETTER
2018    Devang Ybarra MD  [address] [address]    Patient: Zakia Barragan   YOB: 1960   Date of Visit: 2018       Dear Dr Wendie Garibay:    Please review the attached summary of Del Arce progress and our plan for continued therapy, and verify that you agree therapy should continue by signing the attached document and sending it back to our office  If you have any questions or concerns, please don't hesitate to call  Sincerely,        Dhiraj Martinez, PT          CC: No Recipients            PT Re-Evaluation     Today's date: 2018  Patient name: Zakia Barragan  : 1960  MRN: [id number]  Referring provider: Evy Grigsby MD  Dx:   Encounter Diagnoses   Name Primary?  Lateral epicondylitis, left elbow Yes    Anesthesia of skin     Paresthesia of left leg     Other disturbances of skin sensation                   Assessment  Impairments: abnormal gait, abnormal or restricted ROM, impaired physical strength and weight-bearing intolerance    Assessment details: Patient reported long standing dysfunction on left le that still persists after fall at work  Patient noted left le has constant pain and numbness in lower leg distal and prolonged weight baring creates entire left le paraesthesias and pain and left ankle foot become edematous  Patient noted he unable to tolerate wearing his left work boot that is a high top boot that is steel tipped and steel shank  Patient noted left le remains weak as well  Patient noted he can tolerate only 1 5 hours of weight baring functional activities  Patient noted walking, stair climbing remain limited by left le symptoms and impairments  Patient noted prolonged sitting especially on an elevated table or chair aggravates his left le paraesthesia presentation    Patient noted left elbow overall is improved and only minimal pain to touch at left lateral epicondyle region at insertion of wrist extensor tendons  Understanding of Dx/Px/POC: good  Goals  ST) left le pain reduction to 0 of 10 at rest and 6 of 10 at worst   2) increase left le and elbow strength by 1/2 grade  3) increase left le range of motion by 5-10 degrees  4) independent home exercise program   LT) left le pain reduction to 0 of 10 at rest and 4 of 10 at worst and elimination of left lateral elbow pain  2) increase left le by 1 grade and elbow strength to full 5/5   3) increase left le range of motion to equal right le range of motion  4) standing tolerance increase to 3 to 4 hours  5) decrease TUG to 10 sec to improve gait status  6) improve mCTSIB by  25 to   50 to reduce risk of falling  7) improve left lower extremity foto score to 50 and left upper extremity score to 46  Plan    Planned modality interventions: TENS, ultrasound, unattended electrical stimulation, H-Wave, thermotherapy: hydrocollator packs and hydrotherapy  Planned therapy interventions: manual therapy, motor coordination training, neuromuscular re-education, patient education, aquatic therapy, body mechanics training, balance/weight bearing training, compression, strengthening, stretching, flexibility, therapeutic activities, therapeutic exercise, gait training, graded exercise and home exercise program  Frequency: 2x week  Duration in weeks: 8  Treatment plan discussed with: patient        Subjective Evaluation    History of Present Illness  Mechanism of injury: Patient noted date of injury in  of left le while injury at work  Patient noted left elbow symptoms occurred during work injury as well while left elbow symptoms increased over time as well  Patient reported left lateral elbow pain is at 0 of 10 and worst at 2 to 3 of 10    Patient noted left le pain is least at 2 of 10 and 8 of 10 at worst   Patient noted left distal le paraesthesias persist constantly below the knee and above the knee with prolonged weight baring activities that also increase his edema in left ankle and foot  Pain  Pain location: Left lower extremity  Quality: burning, throbbing and needle-like  Aggravating factors: sitting, standing, stair climbing, walking and lifting  Progression: no change    Social Support  Steps to enter house: yes  8  Stairs in house: yes   14  Lives in: multiple-level home  Lives with: spouse    Hand dominance: right    Treatments  Previous treatment: medication and physical therapy  Current treatment: physical therapy  Patient Goals  Patient goals for therapy: decreased edema, increased strength, decreased pain, increased motion and return to work          Objective     Palpation     Additional Palpation Details  Patient was + for pain during palpation at minimal levels at medial and lateral malleolar region as well as moderate at plantar fascia region  Tenderness     Left Elbow   Tenderness in the lateral epicondyle  Left Wrist/Hand   Tenderness in the lateral epicondyle  Additional Tenderness Details  Patient noted moderate tender to palpation at left lateral epicondyle and and common wrist extensor tendon region  Active Range of Motion     Left Elbow   Flexion: 135 degrees   Extension: Left elbow active extension: - 2  Forearm supination: 80 degrees   Forearm pronation: 88 degrees     Right Elbow   Flexion: 138 degrees   Extension: Right elbow active extension: - 5     Forearm supination: 85 degrees   Forearm pronation: 86 degrees   Left Ankle/Foot   Dorsiflexion (ke): 4 degrees with pain  Plantar flexion: 35 degrees   Inversion: 25 degrees   Eversion: 8 degrees     Right Ankle/Foot   Dorsiflexion (ke): 5 degrees   Plantar flexion: 55 degrees   Inversion: 25 degrees   Eversion: 10 degrees     Strength/Myotome Testing     Left Elbow   Flexion: 5  Extension: 4+  Forearm supination: 5  Forearm pronation: 5    Right Elbow   Flexion: 5  Extension: 4+  Forearm supination: 5  Forearm pronation: 5    Left Hip   Planes of Motion   Flexion: 4-  Extension: 4-  Abduction: 4-  Adduction: 4    Right Hip   Planes of Motion   Flexion: 5  Extension: 5  Abduction: 5  Adduction: 5    Left Knee   Flexion: 4  Extension: 4-    Right Knee   Flexion: 5  Prone flexion: 5    Left Ankle/Foot   Dorsiflexion: 4-  Plantar flexion: 4+  Inversion: 4  Eversion: 4-    Right Ankle/Foot   Dorsiflexion: 5  Plantar flexion: 5  Inversion: 5  Eversion: 4+    Swelling   Left Ankle/Foot   Metatarsal heads: 24 9 cm  Figure 8: 54 cm  Malleoli: 25 9 cm    Right Ankle/Foot   Metatarsal heads: 24 7 cm  Figure 8: 53 8 cm  Malleoli: 25 2 cm    Ambulation     Ambulation: Level Surfaces   Ambulation without assistive device: independent    Additional Level Surfaces Ambulation Details  Patient ambulates with decrease in pace, increase in base of support, decrease in left le push off with stance phase, and slight bias towards right sided weight baring  TUG is at 12 32 sec  Ambulation: Stairs   Ascend stairs: independent  Pattern: reciprocal  Descend stairs: independent  Pattern: reciprocal    Additional Stairs Ambulation Details  Patient performs stairs with unilateral railing with ascent and descent with decrease in pace with reciprocal pattern with ascent and decrease in left le push off with ascent and decrease in left stance with stair descent due to weight baring reduction with left le weight baring and stair descent increase in base of support and hip in ER and again bias towards right sided weight baring  Comments   MCTSIB: Eyes Open on Firm Surface with patient sway index at 1 56; Eyes Closed on Firm Surface with patient sway index at 1 34; Eyes Open on Foam Surface with patient sway index at 1 36; Eyes Closed on Foam Surface with patient sway index at 1 58  Precautions: DVT History, Fall History      Daily Treatment Diary     Manual              Retrograde massage to left ankle and foot                                                                     Exercise Diary Katiana             LAQ:B:             Hip flexion:B:             Hamstring stretch:B:             Gastroc stretch with strap:B:             Ankle arom:L             Ankle tband: red: L             Standing slr x 3:B:             Mini squats:             Lunges:B:             SLS:B:             Tandem Stance:B:             Side stepping:B             Tandem ambulation:             [employer] Balance system: limits of stability: level             Step ups:B:             Step downs:B:                                                        Modalities              TENS and MHP to left ankle and foot                                                               Based upon review of the patient's progress and continued therapy plan, it is my medical opinion that Tiny Engle should continue physical therapy treatment at the [facility]:

## 2018-02-07 ENCOUNTER — TRANSCRIBE ORDERS (OUTPATIENT)
Dept: ADMINISTRATIVE | Facility: HOSPITAL | Age: 58
End: 2018-02-07

## 2018-02-07 DIAGNOSIS — R20.0 NUMBNESS: Primary | ICD-10-CM

## 2018-04-03 ENCOUNTER — HOSPITAL ENCOUNTER (OUTPATIENT)
Dept: RADIOLOGY | Facility: CLINIC | Age: 58
Discharge: HOME | End: 2018-04-03
Payer: OTHER MISCELLANEOUS

## 2018-04-03 DIAGNOSIS — R20.0 NUMBNESS: ICD-10-CM

## 2018-04-03 PROCEDURE — 95909 NRV CNDJ TST 5-6 STUDIES: CPT | Performed by: PHYSICAL MEDICINE & REHABILITATION

## 2018-04-03 PROCEDURE — 95860 NEEDLE EMG 1 EXTREMITY: CPT | Performed by: PHYSICAL MEDICINE & REHABILITATION

## 2018-04-03 PROCEDURE — 95886 MUSC TEST DONE W/N TEST COMP: CPT | Performed by: PHYSICAL MEDICINE & REHABILITATION

## 2018-05-03 ENCOUNTER — ANESTHESIA EVENT (OUTPATIENT)
Dept: PERIOP | Facility: HOSPITAL | Age: 58
End: 2018-05-03
Payer: COMMERCIAL

## 2018-05-04 ENCOUNTER — HOSPITAL ENCOUNTER (OUTPATIENT)
Facility: HOSPITAL | Age: 58
Setting detail: OUTPATIENT SURGERY
Discharge: HOME | End: 2018-05-04
Attending: PODIATRIST | Admitting: PODIATRIST
Payer: COMMERCIAL

## 2018-05-04 ENCOUNTER — ANESTHESIA (OUTPATIENT)
Dept: PERIOP | Facility: HOSPITAL | Age: 58
End: 2018-05-04
Payer: COMMERCIAL

## 2018-05-04 VITALS
OXYGEN SATURATION: 96 % | SYSTOLIC BLOOD PRESSURE: 119 MMHG | BODY MASS INDEX: 24.44 KG/M2 | WEIGHT: 165 LBS | HEIGHT: 69 IN | HEART RATE: 75 BPM | TEMPERATURE: 97.8 F | DIASTOLIC BLOOD PRESSURE: 73 MMHG | RESPIRATION RATE: 13 BRPM

## 2018-05-04 DIAGNOSIS — Z98.890 S/P FOOT SURGERY, LEFT: Primary | ICD-10-CM

## 2018-05-04 RX ORDER — DEXAMETHASONE SODIUM PHOSPHATE 4 MG/ML
INJECTION, SOLUTION INTRA-ARTICULAR; INTRALESIONAL; INTRAMUSCULAR; INTRAVENOUS; SOFT TISSUE AS NEEDED
Status: DISCONTINUED | OUTPATIENT
Start: 2018-05-04 | End: 2018-05-04 | Stop reason: HOSPADM

## 2018-05-04 RX ORDER — MEPERIDINE HYDROCHLORIDE 50 MG/ML
12.5 INJECTION INTRAMUSCULAR; INTRAVENOUS; SUBCUTANEOUS
Status: DISCONTINUED | OUTPATIENT
Start: 2018-05-04 | End: 2018-05-04 | Stop reason: HOSPADM

## 2018-05-04 RX ORDER — SODIUM CHLORIDE 9 MG/ML
125 INJECTION, SOLUTION INTRAVENOUS CONTINUOUS
Status: DISCONTINUED | OUTPATIENT
Start: 2018-05-04 | End: 2018-05-04 | Stop reason: HOSPADM

## 2018-05-04 RX ORDER — ONDANSETRON 2 MG/ML
INJECTION INTRAMUSCULAR; INTRAVENOUS AS NEEDED
Status: DISCONTINUED | OUTPATIENT
Start: 2018-05-04 | End: 2018-05-04 | Stop reason: SURG

## 2018-05-04 RX ORDER — OXYCODONE HYDROCHLORIDE AND ACETAMINOPHEN 5; 325 MG/1; MG/1
1 TABLET ORAL EVERY 4 HOURS PRN
Status: DISCONTINUED | OUTPATIENT
Start: 2018-05-04 | End: 2018-05-04 | Stop reason: HOSPADM

## 2018-05-04 RX ORDER — BUPIVACAINE HYDROCHLORIDE 5 MG/ML
INJECTION, SOLUTION PERINEURAL AS NEEDED
Status: DISCONTINUED | OUTPATIENT
Start: 2018-05-04 | End: 2018-05-04 | Stop reason: HOSPADM

## 2018-05-04 RX ORDER — MIDAZOLAM HYDROCHLORIDE 1 MG/ML
INJECTION INTRAMUSCULAR; INTRAVENOUS AS NEEDED
Status: DISCONTINUED | OUTPATIENT
Start: 2018-05-04 | End: 2018-05-04 | Stop reason: SURG

## 2018-05-04 RX ORDER — ONDANSETRON 2 MG/ML
4 INJECTION INTRAMUSCULAR; INTRAVENOUS ONCE
Status: DISCONTINUED | OUTPATIENT
Start: 2018-05-04 | End: 2018-05-04 | Stop reason: HOSPADM

## 2018-05-04 RX ORDER — PROPOFOL 10 MG/ML
INJECTION, EMULSION INTRAVENOUS AS NEEDED
Status: DISCONTINUED | OUTPATIENT
Start: 2018-05-04 | End: 2018-05-04 | Stop reason: SURG

## 2018-05-04 RX ORDER — LIDOCAINE HYDROCHLORIDE 10 MG/ML
INJECTION, SOLUTION INFILTRATION; PERINEURAL AS NEEDED
Status: DISCONTINUED | OUTPATIENT
Start: 2018-05-04 | End: 2018-05-04 | Stop reason: HOSPADM

## 2018-05-04 RX ORDER — FENTANYL CITRATE 50 UG/ML
INJECTION, SOLUTION INTRAMUSCULAR; INTRAVENOUS AS NEEDED
Status: DISCONTINUED | OUTPATIENT
Start: 2018-05-04 | End: 2018-05-04 | Stop reason: SURG

## 2018-05-04 RX ORDER — FENTANYL CITRATE/PF 50 MCG/ML
25 SYRINGE (ML) INJECTION
Status: DISCONTINUED | OUTPATIENT
Start: 2018-05-04 | End: 2018-05-04 | Stop reason: HOSPADM

## 2018-05-04 RX ORDER — OXYCODONE HYDROCHLORIDE AND ACETAMINOPHEN 5; 325 MG/1; MG/1
1 TABLET ORAL EVERY 6 HOURS PRN
Qty: 16 TABLET | Refills: 0 | Status: SHIPPED | OUTPATIENT
Start: 2018-05-04 | End: 2018-05-14

## 2018-05-04 RX ADMIN — FENTANYL CITRATE 25 MCG: 50 INJECTION, SOLUTION INTRAMUSCULAR; INTRAVENOUS at 14:58

## 2018-05-04 RX ADMIN — ONDANSETRON HYDROCHLORIDE 4 MG: 2 INJECTION, SOLUTION INTRAVENOUS at 12:32

## 2018-05-04 RX ADMIN — DEXAMETHASONE SODIUM PHOSPHATE 8 MG: 10 INJECTION INTRAMUSCULAR; INTRAVENOUS at 12:32

## 2018-05-04 RX ADMIN — MIDAZOLAM HYDROCHLORIDE 2 MG: 1 INJECTION, SOLUTION INTRAMUSCULAR; INTRAVENOUS at 12:22

## 2018-05-04 RX ADMIN — PROPOFOL 200 MG: 10 INJECTION, EMULSION INTRAVENOUS at 12:32

## 2018-05-04 RX ADMIN — SODIUM CHLORIDE: 0.9 INJECTION, SOLUTION INTRAVENOUS at 12:47

## 2018-05-04 RX ADMIN — FENTANYL CITRATE 50 MCG: 50 INJECTION, SOLUTION INTRAMUSCULAR; INTRAVENOUS at 14:06

## 2018-05-04 RX ADMIN — Medication 1000 MG: at 12:22

## 2018-05-04 RX ADMIN — SODIUM CHLORIDE 125 ML/HR: 0.9 INJECTION, SOLUTION INTRAVENOUS at 11:52

## 2018-05-04 RX ADMIN — FENTANYL CITRATE 50 MCG: 50 INJECTION, SOLUTION INTRAMUSCULAR; INTRAVENOUS at 12:32

## 2018-05-04 NOTE — OP NOTE
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]     * Other specified mononeuropathies of left lower limb [G57 82]    Procedures: Decompression of common peroneal nerve, neurectomy of superficial peroneal nerve and sural nerve with implantation of remaining nerves into muscle belly, all left lower extremity     Hemostasis:  Pneumatic thigh tourniquet about left thigh for 95 minutes at 300 mm of mercury    Estimated Blood Loss:   Minimal    Materials:  Vicryl, Monocryl, Prolene, Mastisol, 4 x 4, Tegaderm, Kerlix, Ace    Injectables:  15 cc of 0 5% Marcaine plain, 9 cc of 1% lidocaine plain, 4 cc mixture composed of 3 cc 0 5% Marcaine plain and 1 cc of 4 mg/kg of Decadron    Anesthesia Type:   Choice + local     Operative Indications:  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, left leg, sequela [S94 32XS]  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]  Other specified mononeuropathies of left lower limb [G57 82]    Operative Findings:  Consistent with diagnosis: common peroneal nerve, superficial peroneal nerve, and sural nerve with surrounding soft tissue adhesions creating compression upon the nerves  Nerves freed up from surrounding soft tissue adhesions and superficial peroneal and sural nerves successfully transected and implanted into underlying muscle belly       Complications:   None     Procedure and Technique:  Under mild sedation, the patient was brought into the operating room and placed on the operating room table in the supine position  A pneumatic thigh tourniquet was then placed around the patient's left thigh with ample webril padding  A time out was performed to confirm the correct patient, procedure and site with all parties in agreement  The left lower extremity was then scrubbed, prepped and draped in the usual aseptic manner  An esmarch bandage was utilized to [employer] the patient's foot and lower extremity after which the pneumatic thigh tourniquet was then inflated  The esmarch bandage was removed and the lower extremity was placed on the operating room table  A skin marker was utilized to plan the incision sites at the postero-lateral aspect just distal to the knee, anterolateral leg approximately 10-12 cm from the tip of the lateral malleolus, and posterolateral lower extremity, at approximate locations of common peroneal, superficial peroneal, and sural nerves, respectively  Periphery of all planned incision sites were injected with local  Attention was first directed to the postero-lateral aspect just distal to the knee where a sterile 15 blade was utilized to make a linear, controlled-depth incision  This was carried deep through subcutaneous tissue and fascia utilizing both sharp and blunt dissection with the tenotomy scissor  Care was taken to identify and retract all vital neural vascular structures  The deep fascia was identified and carefully dissected through   The common peroneal nerve was identified just deep to this and care was taken to preserve the branches  The nerve was traced distally to the peroneus longus muscle belly at which point the fascia over the peroneus longus was transected through   The peroneus longus muscle belly was retracted and just deep to this a fascial band was identified and released   Adequate release was confirmed via tactile palpation   The nerve was noted to be freed up from surrounding adhesions from surrounding soft tissue both proximally and distally   Once satisfactory decompression was achieved the region was irrigated with normal saline  Subcutaneous closure was achieved utilizing Vicryl and skin was reapproximated utilizing Monocryl in subcuticular fashion  Attention was then directed to the anterolateral leg approximately 10-12cm from the tip of the lateral malleolus where a 15 blade was utilized to make a linear, controlled-depth incision  This was carried deeper through subcutaneous fascia and soft tissue  The fascia was carefully dissected through using tenotomy scissor and the superficial peroneal nerve was visualized  This was freed up from surrounding soft tissue adhesions  At this time a 15 blade was utilized to transect through the substance of the superficial peroneal nerve  The distal aspect of the nerve was then implanted into the underlying muscle belly  This was sutured into place utilized prolene  Region was irrigated with normal saline  Subcutaneous closure achieved with vicryl and subcuticular closure achieved with Monocryl  Attention was then directed to the posterolateral lower extremity where a curvilinear incision was made with 15 blade  Dissection carried deeper in controlled-depth fashion through subcutaneous tissue and fascia using tenotomy scissor  The lesser saphenous vein was identified and the sural nerve was seen to be coursing in proximity to it  The nerve was teased away from surrounding soft tissue adhesions and a using scalpel, was transected through  The distal aspect of the nerve was then implanted into the underlying soleal muscle belly  This was sutured into place using prolene  Region was irrigated with normal saline  Subcutaneous closure achieved with vicryl and subcuticular closure achieved with Monocryl  A post-operative injection using local anesthetic was then injected to periphery of all incision sites   All incision sites were dressed in similar fashion using Mastisol, steri-strips overlying, 4x4, Tegaderm  Kerlix and ACE wrap was then applied  The pneumatic thigh tourniquet was deflated at 95 minutes and normal hyperemic flush noted to the foot  Patient tolerated the procedure well and was transported to PACU with all vital signs stable       Patient Disposition:  PACU     SIGNATURE: Adolph Kidd DPM  DATE: May 4, 2018  TIME: 2:36 PM

## 2018-05-04 NOTE — ANESTHESIA POSTPROCEDURE EVALUATION
Post-Op Assessment Note      CV Status:  Stable    Mental Status:  Alert and awake    Hydration Status:  Euvolemic    PONV Controlled:  Controlled    Airway Patency:  Patent    Post Op Vitals Reviewed:  Yes              /75 (05/04/18 1445)    Temp      Pulse 82 (05/04/18 1445)   Resp 18 (05/04/18 1445)    SpO2 95 % (05/04/18 1445)

## 2018-05-04 NOTE — ANESTHESIA PREPROCEDURE EVALUATION
Review of Systems/Medical History  Patient summary reviewed  Chart reviewed      Cardiovascular  Exercise tolerance: good,  Hyperlipidemia,    Pulmonary  Smoker cigarette smoker  , Tobacco cessation counseling given , COPD mild- PRN medicaiton ,        GI/Hepatic  Negative GI/hepatic ROS          Negative  ROS        Endo/Other  Negative endo/other ROS      GYN  Negative gynecology ROS          Hematology  Negative hematology ROS      Musculoskeletal    Arthritis     Neurology  Negative neurology ROS      Psychology           Physical Exam    Airway    Mallampati score: II  TM Distance: <3 FB  Neck ROM: full     Dental       Cardiovascular  Rhythm: regular, Rate: normal,     Pulmonary  Breath sounds clear to auscultation,     Other Findings        Anesthesia Plan  ASA Score- 3     Anesthesia Type- general with ASA Monitors  Additional Monitors:   Airway Plan:         Plan Factors- Patient instructed to abstain from smoking on day of procedure  Patient smoked on day of surgery  Induction- intravenous  Postoperative Plan-     Informed Consent- Anesthetic plan and risks discussed with patient

## 2018-05-04 NOTE — DISCHARGE SUMMARY
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, left leg, sequela [S94 32XS]  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]  Other specified mononeuropathies of left lower limb [G57 82]    Discharge Diagnosis: same    Procedures Performed: Decompression of common peroneal nerve, neurolysis of superficial peroneal nerve and sural nerve with implantation of nerves into muscle belly, all left lower extremity     Complications: none    Condition at Discharge: stable    Discharge instructions/Information to patient and family:   See after visit summary for information provided to patient and family  Provisions for Follow-Up Care/Important appointments:  See after visit summary for information related to follow-up care and any pertinent home health orders  Discharge Medications:  See after visit summary for reconciled discharge medications provided to patient and family

## 2018-06-15 ENCOUNTER — EVALUATION (OUTPATIENT)
Dept: PHYSICAL THERAPY | Age: 58
End: 2018-06-15
Payer: COMMERCIAL

## 2018-06-15 DIAGNOSIS — S84.12XD INJURY OF LEFT PERONEAL NERVE, SUBSEQUENT ENCOUNTER: Primary | ICD-10-CM

## 2018-06-15 PROCEDURE — 97163 PT EVAL HIGH COMPLEX 45 MIN: CPT | Performed by: PHYSICAL THERAPIST

## 2018-06-15 PROCEDURE — G8979 MOBILITY GOAL STATUS: HCPCS | Performed by: PHYSICAL THERAPIST

## 2018-06-15 PROCEDURE — 97140 MANUAL THERAPY 1/> REGIONS: CPT | Performed by: PHYSICAL THERAPIST

## 2018-06-15 PROCEDURE — G8978 MOBILITY CURRENT STATUS: HCPCS | Performed by: PHYSICAL THERAPIST

## 2018-06-15 NOTE — LETTER
Ninfa 15, 2018    Pete Castillo MD  [address]  [facility] [address]    Patient: Lon Smith   YOB: 1960   Date of Visit: 6/15/2018     Encounter Diagnosis     ICD-10-CM    1  Injury of left peroneal nerve, subsequent encounter S84  12XD        Dear Dr Demetrius Johnsonh:    Please review the attached Plan of Care from [facility] recent visit  Please verify that you agree therapy should continue by signing the attached document and sending it back to our office  If you have any questions or concerns, please don't hesitate to call  Sincerely,    Dionne Martinez, PT      Referring Provider:      I certify that I have read the below Plan of Care and certify the need for these services furnished under this plan of treatment while under my care  Pete Castillo MD  [address]  [address] [place] [address] Trl: [phone]          PT Evaluation     Today's date: 6/15/2018  Patient name: Lon Smith  : 1960  MRN: [id number]  Referring provider: Ayana Jiang MD  Dx:   Encounter Diagnosis     ICD-10-CM    1  Injury of left peroneal nerve, subsequent encounter S84  12XD                   Assessment  Impairments: abnormal gait, abnormal or restricted ROM, impaired physical strength and pain with function    Assessment details: PT IE 06-  Patient's PMHx is remarkable for DVT History, Hyperchloremia, left foot surgical intervention 3 months ago, multiple left foot fractures of left calcaneal tuberosity, left cuboid, left tibial stress fracture, left talar dome osteochondral lesion and left talofibular ligament tear  PRECAUTIONS: lower extremity pain aggravation    Surgical / Admission Date: 2018    Surgical Admitting Diagnoses: 1)  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, left leg, sequela (S94 32XS)  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)  2) Other specified mononeuropathies of left lower limb (G57 82)   Surgical Procedures Performed on 5/4/18: Decompression of common peroneal nerve, neurolysis of superficial peroneal nerve and sural nerve with implantation of nerves into muscle belly, all left lower extremity  Patient noted he tried to return to work which aggravated left foot pain and edema  Patient noted he chose to undergo Decompression of common peroneal nerve, neurolysis of superficial peroneal nerve and sural nerve with implantation of nerves into muscle belly, all left lower extremity on 5-4-18  Patient noted the following symptoms are present after surgical intervention: medial left ankle and foot, lateral distal le sharp pain that radiates into calcaneal region and pain in toes of left foot  Also, he still lacks soft touch tension to left lateral ankle at S 1 dermatome  Patient noted left ankle is weaker than right  Patient noted up to a week ago he was using a spc with all gait activities  Patient noted he continues to have left ankle and foot edema  Patient noted original injury on 7-20-16 he was at work while on a 12 foot ladder and the lights were turned on that caused him to be electrocuted and he fell off the 12 foot ladder and landed on left side of his body  Patient noted he had left distal le pain and paraesthesias since original injury  Understanding of Dx/Px/POC: good   Prognosis: good  Prognosis details: Patient is a 66y o  year old male seen for outpatient PT evaluation with mobility deficits due to s/p Decompression of common peroneal nerve, neurolysis of superficial peroneal nerve and sural nerve with implantation of nerves into muscle belly, all left lower extremity  Patient presents to PT IE with the following problems, concerns, deficits and impairments: left ankle and foot pain, decreased left le range of motion, left le decreased strength, + TTP, left ankle and foot edema, gait and stair dysfunctions, balance deficits, functional limitations and decreased tolerance to activity    Patient would benefit from skilled PT services under the following PT treatment plan to address the above noted deficits:  Therapeutic exercises and activities to facilitate left le ROM and strength, gait and stair training, balance and proprioception activities, modalities, manual therapy techniques, kineiso taping techniques, Instrument aided STM techniques and a hep  Thank you for the referral      Goals  Short Term goals - 4 weeks  1  Patient will be independent HEP  2   Patient will report a 25 - 50% decrease in pain complaints  3   Increase strength 1/2 grade  4   Increase ROM 5-10 degrees  Long Term goals - 8 weeks  1  Patient will report elimination of pain complaints  2   Patient will return to all work related activities without restriction  3   Patient will return to all recreational activities without restriction  4   ROM WFL  5   Strength 5/5  Plan  Patient would benefit from: skilled physical therapy  Planned modality interventions: cryotherapy, TENS, thermotherapy: hydrocollator packs and unattended electrical stimulation  Planned therapy interventions: IADL retraining, joint mobilization, manual therapy, activity modification, ADL retraining, ADL training, aquatic therapy, balance, balance/weight bearing training, muscle pump exercises, neuromuscular re-education, patient education, self care, compression, strengthening, stretching, therapeutic activities, therapeutic exercise, therapeutic training, transfer training, flexibility, functional ROM exercises, gait training, graded activity, graded exercise, graded motor and home exercise program  Frequency: 2x week  Duration in weeks: 12  Treatment plan discussed with: patient        Subjective Evaluation    History of Present Illness  Mechanism of injury: PT IE 06-    Patient noted the following deficits still persist after surgical intervention: prolonged walking, stair climbing, static standing functional activities, bending and lifting activities with left UE that promote full left le weight baring, left ankle and foot pain  Patient noted constant stiffness persists in left ankle and foot  Patient noted sitting and lying reduced left distal le and foot pain at 3 of 10  Patient noted he continues to get intermittent bouts of sharp pain that will occur at rest and with walking  Patient noted he still unable to wear a full shoe or boot since surgical intervention  Patient noted walking, stair climbing, full weight baring on left le as pain aggravating up to 8 of 10  Quality of life: good    Pain  At best pain rating: 3  At worst pain ratin  Location: left distal le, ankle and foot region  Quality: throbbing and sharp  Relieving factors: medications  Aggravating factors: standing, stair climbing, walking and lifting    Patient Goals  Patient goals for therapy: decreased edema, decreased pain, improved balance, increased motion, increased strength and independence with ADLs/IADLs  Patient goal: Patient's goal:" to get back to work and regular house hold iadls"  Objective     Tenderness     Additional Tenderness Details  Patient is + TTP at moderate levels at dorsum of left foot, lateral inferior malleolar region and distal lateral lower leg incision      Active Range of Motion   Left Ankle/Foot   Dorsiflexion (ke): 4 degrees   Plantar flexion: 42 degrees   Inversion: 10 degrees   Eversion: 8 degrees     Right Ankle/Foot   Dorsiflexion (ke): 10 degrees   Plantar flexion: 50 degrees   Inversion: 40 degrees   Eversion: 10 degrees     Strength/Myotome Testing     Left Ankle/Foot   Dorsiflexion: 4  Plantar flexion: 4+  Inversion: 4  Eversion: 4-    Right Ankle/Foot   Dorsiflexion: 4+  Plantar flexion: 5  Inversion: 5  Eversion: 4+    Ambulation     Ambulation: Level Surfaces   Ambulation without assistive device: independent    Additional Level Surfaces Ambulation Details  Patient ambulates with decrease in pace, left > right toe out and decrease in left sided weight shift with left sided weight baring  Ambulation: Stairs   Ascend stairs: independent  Pattern: non-reciprocal  Railings: two rails  Descend stairs: independent  Pattern: reciprocal  Railings: two rails    Comments   TU 95 sec,  mCTSIB: Eyes Open on Firm Surface with patient sway index at 1 43; Eyes Closed on Firm Surface with patient sway index at 1 87; Eyes Open on Foam Surface with patient sway index at 1 31; Eyes Closed on Firm Surface with patient sway index at 2 13  General Comments     Ankle/Foot Comments   Girth Measurements: Ankle:  6 CM above lateral malleolar region: Right at 22 3 CM and left at 24 0 Cm;  Bimalleolar region: Right at 26 7 CM and left at 27 2 Cm;  1 st MTP Joint region: Right at 25 1 CM and left at 25 2 Cm; Figure 8 region: Right at 54 1 CM and left at 54 9 CM  Flowsheet Rows      Most Recent Value   PT/OT G-Codes   Current Score  37   Projected Score  51   FOTO information reviewed  Yes   Assessment Type  Evaluation   G code set  Mobility: Walking & Moving Around   Mobility: Walking and Moving Around Current Status ()  CL   Mobility: Walking and Moving Around Goal Status ()  CK          Precautions: Patient's PMHx is remarkable for DVT History, Hyperchloremia, left foot surgical intervention 3 months ago, multiple left foot fractures of left calcaneal tuberosity, left cuboid, left tibial stress fracture, left talar dome osteochondral lesion and left talofibular ligament tear  PRECAUTIONS: lower extremity pain aggravation      Daily Treatment Diary     Manual  6/15            Kinesio taping to left lateral ankle at malleolar region and distal lateral le incision in a basket weave at paper tape tension 10 min                                                                    Exercise Diary  6/15            bike             gastroc stretch on left supine             Hamstring stretch on left             Ankle arom:L             slr x 3 in standing:B:             HR and TR:B:             Mini squats             lunges             Step ups:L: 6":             Step downs:L: 4"             Side stepping with and without squats             Tandem ambulation             biodClipik balance system: limits of stability             prostretch seated             baps seated             HR and TR:B: seated                                                                     Modalities

## 2018-06-15 NOTE — PROGRESS NOTES
PT Evaluation     Today's date: 6/15/2018  Patient name: Jane Devlin  : 1960  MRN: [id number]  Referring provider: Hollie Levin MD  Dx:   Encounter Diagnosis     ICD-10-CM    1  Injury of left peroneal nerve, subsequent encounter S84  12XD                   Assessment  Impairments: abnormal gait, abnormal or restricted ROM, impaired physical strength and pain with function    Assessment details: PT IE 06-  Patient's PMHx is remarkable for DVT History, Hyperchloremia, left foot surgical intervention 3 months ago, multiple left foot fractures of left calcaneal tuberosity, left cuboid, left tibial stress fracture, left talar dome osteochondral lesion and left talofibular ligament tear  PRECAUTIONS: lower extremity pain aggravation  Surgical / Admission Date: 2018    Surgical Admitting Diagnoses: 1)  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, left leg, sequela (S94 32XS)  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)  2) Other specified mononeuropathies of left lower limb (G57 82)   Surgical Procedures Performed on 18: Decompression of common peroneal nerve, neurolysis of superficial peroneal nerve and sural nerve with implantation of nerves into muscle belly, all left lower extremity  Patient noted he tried to return to work which aggravated left foot pain and edema  Patient noted he chose to undergo Decompression of common peroneal nerve, neurolysis of superficial peroneal nerve and sural nerve with implantation of nerves into muscle belly, all left lower extremity on 18  Patient noted the following symptoms are present after surgical intervention: medial left ankle and foot, lateral distal le sharp pain that radiates into calcaneal region and pain in toes of left foot  Also, he still lacks soft touch tension to left lateral ankle at S 1 dermatome  Patient noted left ankle is weaker than right    Patient noted up to a week ago he was using a spc with all gait activities  Patient noted he continues to have left ankle and foot edema  Patient noted original injury on 7-20-16 he was at work while on a 12 foot ladder and the lights were turned on that caused him to be electrocuted and he fell off the 12 foot ladder and landed on left side of his body  Patient noted he had left distal le pain and paraesthesias since original injury  Understanding of Dx/Px/POC: good   Prognosis: good  Prognosis details: Patient is a 66y o  year old male seen for outpatient PT evaluation with mobility deficits due to s/p Decompression of common peroneal nerve, neurolysis of superficial peroneal nerve and sural nerve with implantation of nerves into muscle belly, all left lower extremity  Patient presents to PT IE with the following problems, concerns, deficits and impairments: left ankle and foot pain, decreased left le range of motion, left le decreased strength, + TTP, left ankle and foot edema, gait and stair dysfunctions, balance deficits, functional limitations and decreased tolerance to activity  Patient would benefit from skilled PT services under the following PT treatment plan to address the above noted deficits:  Therapeutic exercises and activities to facilitate left le ROM and strength, gait and stair training, balance and proprioception activities, modalities, manual therapy techniques, kineiso taping techniques, Instrument aided STM techniques and a hep  Thank you for the referral      Goals  Short Term goals - 4 weeks  1  Patient will be independent HEP  2   Patient will report a 25 - 50% decrease in pain complaints  3   Increase strength 1/2 grade  4   Increase ROM 5-10 degrees  Long Term goals - 8 weeks  1  Patient will report elimination of pain complaints  2   Patient will return to all work related activities without restriction  3   Patient will return to all recreational activities without restriction  4   ROM WFL  5   Strength 5/5      Plan  Patient would benefit from: skilled physical therapy  Planned modality interventions: cryotherapy, TENS, thermotherapy: hydrocollator packs and unattended electrical stimulation  Planned therapy interventions: IADL retraining, joint mobilization, manual therapy, activity modification, ADL retraining, ADL training, aquatic therapy, balance, balance/weight bearing training, muscle pump exercises, neuromuscular re-education, patient education, self care, compression, strengthening, stretching, therapeutic activities, therapeutic exercise, therapeutic training, transfer training, flexibility, functional ROM exercises, gait training, graded activity, graded exercise, graded motor and home exercise program  Frequency: 2x week  Duration in weeks: 12  Treatment plan discussed with: patient        Subjective Evaluation    History of Present Illness  Mechanism of injury: PT IE 06-  Patient noted the following deficits still persist after surgical intervention: prolonged walking, stair climbing, static standing functional activities, bending and lifting activities with left UE that promote full left le weight baring, left ankle and foot pain  Patient noted constant stiffness persists in left ankle and foot  Patient noted sitting and lying reduced left distal le and foot pain at 3 of 10  Patient noted he continues to get intermittent bouts of sharp pain that will occur at rest and with walking  Patient noted he still unable to wear a full shoe or boot since surgical intervention  Patient noted walking, stair climbing, full weight baring on left le as pain aggravating up to 8 of 10      Quality of life: good    Pain  At best pain rating: 3  At worst pain ratin  Location: left distal le, ankle and foot region  Quality: throbbing and sharp  Relieving factors: medications  Aggravating factors: standing, stair climbing, walking and lifting    Patient Goals  Patient goals for therapy: decreased edema, decreased pain, improved balance, increased motion, increased strength and independence with ADLs/IADLs  Patient goal: Patient's goal:" to get back to work and regular house hold iadls"  Objective     Tenderness     Additional Tenderness Details  Patient is + TTP at moderate levels at dorsum of left foot, lateral inferior malleolar region and distal lateral lower leg incision  Active Range of Motion   Left Ankle/Foot   Dorsiflexion (ke): 4 degrees   Plantar flexion: 42 degrees   Inversion: 10 degrees   Eversion: 8 degrees     Right Ankle/Foot   Dorsiflexion (ke): 10 degrees   Plantar flexion: 50 degrees   Inversion: 40 degrees   Eversion: 10 degrees     Strength/Myotome Testing     Left Ankle/Foot   Dorsiflexion: 4  Plantar flexion: 4+  Inversion: 4  Eversion: 4-    Right Ankle/Foot   Dorsiflexion: 4+  Plantar flexion: 5  Inversion: 5  Eversion: 4+    Ambulation     Ambulation: Level Surfaces   Ambulation without assistive device: independent    Additional Level Surfaces Ambulation Details  Patient ambulates with decrease in pace, left > right toe out and decrease in left sided weight shift with left sided weight baring  Ambulation: Stairs   Ascend stairs: independent  Pattern: non-reciprocal  Railings: two rails  Descend stairs: independent  Pattern: reciprocal  Railings: two rails    Comments   TU 95 sec,  mCTSIB: Eyes Open on Firm Surface with patient sway index at 1 43; Eyes Closed on Firm Surface with patient sway index at 1 87; Eyes Open on Foam Surface with patient sway index at 1 31; Eyes Closed on Firm Surface with patient sway index at 2 13  General Comments     Ankle/Foot Comments   Girth Measurements: Ankle:  6 CM above lateral malleolar region: Right at 22 3 CM and left at 24 0 Cm;  Bimalleolar region: Right at 26 7 CM and left at 27 2 Cm;  1 st MTP Joint region: Right at 25 1 CM and left at 25 2 Cm; Figure 8 region: Right at 54 1 CM and left at 54 9 CM        Flowsheet Rows      Most Recent Value   PT/OT G-Codes   Current Score  37   Projected Score  51   FOTO information reviewed  Yes   Assessment Type  Evaluation   G code set  Mobility: Walking & Moving Around   Mobility: Walking and Moving Around Current Status ()  CL   Mobility: Walking and Moving Around Goal Status ()  CK          Precautions: Patient's PMHx is remarkable for DVT History, Hyperchloremia, left foot surgical intervention 3 months ago, multiple left foot fractures of left calcaneal tuberosity, left cuboid, left tibial stress fracture, left talar dome osteochondral lesion and left talofibular ligament tear  PRECAUTIONS: lower extremity pain aggravation      Daily Treatment Diary     Manual  6/15            Kinesio taping to left lateral ankle at malleolar region and distal lateral le incision in a basket weave at paper tape tension 10 min                                                                    Exercise Diary  6/15            bike             gastroc stretch on left supine             Hamstring stretch on left             Ankle arom:L             slr x 3 in standing:B:             HR and TR:B:             Mini squats             lunges             Step ups:L: 6":             Step downs:L: 4"             Side stepping with and without squats             Tandem ambulation             biod[employer]uta balance system: limits of stability             prostretch seated             baps seated             HR and TR:B: seated                                                                     Modalities

## 2018-06-21 ENCOUNTER — OFFICE VISIT (OUTPATIENT)
Dept: PHYSICAL THERAPY | Age: 58
End: 2018-06-21
Payer: COMMERCIAL

## 2018-06-21 DIAGNOSIS — S84.12XD INJURY OF LEFT PERONEAL NERVE, SUBSEQUENT ENCOUNTER: Primary | ICD-10-CM

## 2018-06-21 PROCEDURE — 97110 THERAPEUTIC EXERCISES: CPT

## 2018-06-21 PROCEDURE — 97112 NEUROMUSCULAR REEDUCATION: CPT

## 2018-06-21 NOTE — PROGRESS NOTES
Daily Note     Today's date: 2018  Patient name: Mercedes Sanchez  : 1960  MRN: [id number]  Referring provider: Rosetta Wen MD  Dx:   Encounter Diagnosis     ICD-10-CM    1  Injury of left peroneal nerve, subsequent encounter S84  12XD        Start Time: 0900  Stop Time: 1000  Total time in clinic (min): 60 minutes    Subjective: "My foot still hurts and gets swollen even after the surgery"       Objective: See treatment diary below      Assessment: Pt presents with edema and increased pain at L foot with exercises  Monitor symptoms and progress as able       Plan: Cont as per plan of care              Precautions: Patient's PMHx is remarkable for DVT History, Hyperchloremia, left foot surgical intervention 3 months ago, multiple left foot fractures of left calcaneal tuberosity, left cuboid, left tibial stress fracture, left talar dome osteochondral lesion and left talofibular ligament tear  PRECAUTIONS: lower extremity pain aggravation      Daily Treatment Diary     Manual  6/15 6/21           Kinesio taping to left lateral ankle at malleolar region and distal lateral le incision in a basket weave at paper tape tension 10 min 10 min                                                                    Exercise Diary  6/15 6/21           bike  10 min            gastroc stretch on left supine  20sec 5x            Hamstring stretch on left  20sec 5x            Ankle arom:L  20x            slr x 3 in standing:B:  20x            HR and TR:B:  20x            Mini squats  20x           lunges  NT            Step ups:L and lat  6":  20x           Step downs:L: 4"  NT            Side stepping with and without squats  6x inside bars           Tandem ambulation  6x inside bars            biodex balance system: limits of stability  NT            prostretch seated  20sec 5x            baps seated  NT            HR and TR:B: seated  30x                                                                    Modalities

## 2018-06-22 ENCOUNTER — OFFICE VISIT (OUTPATIENT)
Dept: PHYSICAL THERAPY | Age: 58
End: 2018-06-22
Payer: COMMERCIAL

## 2018-06-22 DIAGNOSIS — S84.12XD INJURY OF LEFT PERONEAL NERVE, SUBSEQUENT ENCOUNTER: Primary | ICD-10-CM

## 2018-06-22 PROCEDURE — 97110 THERAPEUTIC EXERCISES: CPT

## 2018-06-22 NOTE — PROGRESS NOTES
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        Start Time: 830  Stop Time: 0930  Total time in clinic (min): 60 minutes    Subjective: "My foot still hurts and gets swollen even after the surgery"       Objective: See treatment diary below      Assessment: Pt presents with edema and increased pain at L foot with exercises  Monitor symptoms and progress as able       Plan: Cont as per plan of care              Precautions: Patient's PMHx is remarkable for DVT History, Hyperchloremia, left foot surgical intervention 3 months ago, multiple left foot fractures of left calcaneal tuberosity, left cuboid, left tibial stress fracture, left talar dome osteochondral lesion and left talofibular ligament tear  PRECAUTIONS: lower extremity pain aggravation      Daily Treatment Diary     Manual  6/15 6/21           Kinesio taping to left lateral ankle at malleolar region and distal lateral le incision in a basket weave at paper tape tension 10 min 10 min                                                                    Exercise Diary  6/15 6/21 6/22          bike  10 min  10 min           gastroc stretch on left supine  20sec 5x  20sec 5x           Hamstring stretch on left  20sec 5x  [id number]           Ankle arom:L  20x  20x           slr x 3 in standing:B:  20x  20x           HR and TR:B:  20x  20x           Mini squats  20x 20x           lunges  NT  NT           Step ups:L and lat  6":  20x 20x           Step downs:L: 4"  NT  20x           Side stepping with and without squats  6x inside bars 6x          Tandem ambulation  6x inside bars  6x          [employer] balance system: limits of stability  NT            prostretch seated  20sec 5x  20sec 5x           baps seated  NT  NT          HR and TR:B: seated  30x  30x Modalities

## 2018-06-26 ENCOUNTER — OFFICE VISIT (OUTPATIENT)
Dept: PHYSICAL THERAPY | Age: 58
End: 2018-06-26
Payer: COMMERCIAL

## 2018-06-26 DIAGNOSIS — S84.12XD INJURY OF LEFT PERONEAL NERVE, SUBSEQUENT ENCOUNTER: Primary | ICD-10-CM

## 2018-06-26 PROCEDURE — 97112 NEUROMUSCULAR REEDUCATION: CPT

## 2018-06-26 PROCEDURE — 97110 THERAPEUTIC EXERCISES: CPT

## 2018-06-26 NOTE — PROGRESS NOTES
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  12XD                   Subjective: 8/10 pain impact while driving aggravates pain, edema persists       Objective: See treatment diary below      Assessment: Challenged with all WB exercises due to edema and pain  Monitor symptoms and progress as able       Plan: Cont as per plan of care              Precautions: Patient's PMHx is remarkable for DVT History, Hyperchloremia, left foot surgical intervention 3 months ago, multiple left foot fractures of left calcaneal tuberosity, left cuboid, left tibial stress fracture, left talar dome osteochondral lesion and left talofibular ligament tear  PRECAUTIONS: lower extremity pain aggravation      Daily Treatment Diary     Manual  6/15 6/21 6/26          Kinesio taping to left lateral ankle at malleolar region and distal lateral le incision in a basket weave at paper tape tension 10 min 10 min  10 min                                                                   Exercise Diary  6/15 6/21 6/22 6/26         bike  10 min  10 min  10 min          gastroc stretch on left supine  20sec 5x  20sec 5x  20sec 5x          Hamstring stretch on left  20sec 5x  [id number]  20sec 5x          Ankle arom:L  20x  20x  20x         slr x 3 in standing:B:  20x  20x  20x         HR and TR:B:  20x  20x  20x         Mini squats  20x 20x  20x         lunges  NT  NT  20x         Step ups:L and lat  6":  20x 20x  20x         Step downs:L: 4"  NT  20x  20x         Side stepping with and without squats  6x inside bars 6x 6x         Backwards marching     6x          Tandem ambulation  6x inside bars  6x 6x         biodex balance system: limits of stability and maze   NT   3x each levels 5 to 8          prostretch seated  20sec 5x  20sec 5x  20sec 5x baps seated  NT  NT Level 2   P/D  Ev/In 20x          HR and TR:B: seated  30x  30x  NT                                                                  Modalities              CP supine to R foot on wedge

## 2018-06-28 ENCOUNTER — OFFICE VISIT (OUTPATIENT)
Dept: PHYSICAL THERAPY | Age: 58
End: 2018-06-28
Payer: COMMERCIAL

## 2018-06-28 DIAGNOSIS — S84.12XD INJURY OF LEFT PERONEAL NERVE, SUBSEQUENT ENCOUNTER: Primary | ICD-10-CM

## 2018-06-28 PROCEDURE — 97110 THERAPEUTIC EXERCISES: CPT

## 2018-06-28 PROCEDURE — 97010 HOT OR COLD PACKS THERAPY: CPT

## 2018-06-28 PROCEDURE — 97140 MANUAL THERAPY 1/> REGIONS: CPT

## 2018-06-28 NOTE — PROGRESS NOTES
Daily Note     Today's date: 2018  Patient name: Yunier Lundy  : 1960  MRN: [id number]  Referring provider: Mandeep Boston MD  Dx:   Encounter Diagnosis     ICD-10-CM    1  Injury of left peroneal nerve, subsequent encounter S84  12XD                   Subjective: Pain and edema persists as per patient  "I put my sneakers on today my foot feels so tight in it"       Objective: See treatment diary below      Assessment: Man work was challenging and increased pain at incision areas  Incision area appear swollen and tender to touch today  Progress as able       Plan: Cont as per plan of care              Precautions: Patient's PMHx is remarkable for DVT History, Hyperchloremia, left foot surgical intervention 3 months ago, multiple left foot fractures of left calcaneal tuberosity, left cuboid, left tibial stress fracture, left talar dome osteochondral lesion and left talofibular ligament tear  PRECAUTIONS: lower extremity pain aggravation      Daily Treatment Diary     Manual  6/15 6/21 6/26 6/28         Kinesio taping to left lateral ankle at malleolar region and distal lateral le incision in a basket weave at paper tape tension 10 min 10 min  10 min  NT          [place] to incisions at L LE     10 min                                                     Exercise Diary  6/15 6/21 6/22 6/26 6/28        bike  10 min  10 min  10 min  10 min        gastroc stretch on left supine  20sec 5x  20sec 5x  20sec 5x  20sec 5x         Hamstring stretch on left  20sec 5x  [id number]  20sec 5x  20sec 5x         Ankle arom:L  20x  20x  20x NT         slr x 3 in standing:B:  20x  20x  20x 20x 2#         HR and TR:B:  20x  20x  20x 20x         Mini squats  20x 20x  20x 20x         lunges  NT  NT  20x 20x         Step ups:L and lat  6":  20x 20x  20x 20x 2#         Step downs:L: 4"  NT  20x  20x NT         Side stepping with and without squats  6x inside bars 6x 6x 6x 2#         Backwards marching     6x  6x 2# Tandem ambulation  6x inside bars  6x 6x 6x         biodex balance system: limits of stability and maze   NT   3x each levels 5 to 8  NT         prostretch seated  20sec 5x  20sec 5x  20sec 5x  NT         baps seated  NT  NT Level 2   P/D  Ev/In 20x  NT         HR and TR:B: seated  30x  30x  NT  NT         LAQ      2# 20x        SAQ      2# 20x                                      Modalities              CP supine to R foot on wedge

## 2018-07-03 ENCOUNTER — OFFICE VISIT (OUTPATIENT)
Dept: PHYSICAL THERAPY | Age: 58
End: 2018-07-03
Payer: COMMERCIAL

## 2018-07-03 DIAGNOSIS — S84.12XD INJURY OF LEFT PERONEAL NERVE, SUBSEQUENT ENCOUNTER: Primary | ICD-10-CM

## 2018-07-03 PROCEDURE — 97140 MANUAL THERAPY 1/> REGIONS: CPT | Performed by: PHYSICAL THERAPIST

## 2018-07-03 PROCEDURE — 97110 THERAPEUTIC EXERCISES: CPT | Performed by: PHYSICAL THERAPIST

## 2018-07-03 NOTE — PROGRESS NOTES
Daily Note     Today's date: 7/3/2018  Patient name: Som Lamar  : 1960  MRN: [id number]  Referring provider: Tatyana Ang MD  Dx:   Encounter Diagnosis     ICD-10-CM    1  Injury of left peroneal nerve, subsequent encounter S84  12XD                   Subjective: Patient noted left distal le, ankle and foot pain and paraesthesia is at 5 of 10 pain and edema increases as the day progresses and he noted he can not wear a shoe for prolonged period due to edema and pain aggravation  Objective: See treatment diary below  Patient to return to surgeon on 18  Assessment: Patient reports prolonged standing therapeutic exercises mimic standing based functional activity symptom production of aggravation of left distal le pain and left distal le paraesthesia sensation  Plus, pt continues to report left distal le incision region pain and paraesthesia aggravation with light to minimal touch via hand held or instrument aided STM techniques  Patient continues to exhibit standing activities with slight weight shift right as a compensatory technique to reduce left le weight baring and pain aggravation consistent with gait dysfunctions of increase in base of support, left le toe out, decrease in left sided weight shift with left le stance phase  Plan: Cont as per plan of care              Precautions: Patient's PMHx is remarkable for DVT History, Hyperchloremia, left foot surgical intervention 3 months ago, multiple left foot fractures of left calcaneal tuberosity, left cuboid, left tibial stress fracture, left talar dome osteochondral lesion and left talofibular ligament tear  PRECAUTIONS: lower extremity pain aggravation      Daily Treatment Diary     Manual  6/15 6/21 6/26 6/28 7/3        Kinesio taping to left lateral ankle at malleolar region and distal lateral le incision in a basket weave at paper tape tension & Instrument Aided STM to all incisions at L LE  10 min 10 min  10 min  NT  15 min        Instrument Aided STM to all incisions at L LE     10 min                                                     Exercise Diary  6/15 6/21 6/22 6/26 6/28 7/3       bike  10 min  10 min  10 min  10 min 10 min       gastroc stretch on left supine  20sec 5x  20sec 5x  20sec 5x  20sec 5x  20sec x 5       Hamstring stretch on left  20sec 5x  [id number]  20sec 5x  20sec 5x  20sec x 5       Ankle arom:L  20x  20x  20x NT  2 x 10       Standing gastroc stretch off step:B:      20 sec x 5       Standing gastroc stretch:L      20 sec x 5                    slr x 3 in standing:B:  20x  20x  20x 20x 2#  3#x20       HR and TR:B:  20x  20x  20x 20x  2 x 10       Mini squats  20x 20x  20x 20x  2 x 10       lunges  NT  NT  20x 20x  2 x 10 on foam       Step ups:L and lat  6":  20x 20x  20x 20x 2#  NT       Step downs:L: 4"  NT  20x  20x NT  NT       Side stepping with and without squats  6x inside bars 6x 6x 6x 2#  6x with 3#       Backwards marching     6x  6x 2# NT       Tandem ambulation  6x inside bars  6x 6x 6x  6x with 3#       [employer] balance system: limits of stability and maze   NT   3x each levels 5 to 8  NT  NT       prostretch seated  20sec 5x  20sec 5x  20sec 5x  NT  NT       baps seated  NT  NT Level 2   P/D  Ev/In 20x  NT  NT       HR and TR:B: seated  30x  30x  NT  NT  NT       LAQ      2# 20x NT       SAQ      2# 20x NT                                     Modalities              CP supine to R foot on wedge

## 2018-07-05 ENCOUNTER — OFFICE VISIT (OUTPATIENT)
Dept: PHYSICAL THERAPY | Age: 58
End: 2018-07-05
Payer: COMMERCIAL

## 2018-07-05 DIAGNOSIS — S84.12XD INJURY OF LEFT PERONEAL NERVE, SUBSEQUENT ENCOUNTER: Primary | ICD-10-CM

## 2018-07-05 PROCEDURE — 97140 MANUAL THERAPY 1/> REGIONS: CPT | Performed by: PHYSICAL THERAPIST

## 2018-07-05 PROCEDURE — 97110 THERAPEUTIC EXERCISES: CPT | Performed by: PHYSICAL THERAPIST

## 2018-07-05 NOTE — PROGRESS NOTES
Daily Note     Today's date: 2018  Patient name: Som Lamar  : 1960  MRN: [id number]  Referring provider: Tatyana Ang MD  Dx:   Encounter Diagnosis     ICD-10-CM    1  Injury of left peroneal nerve, subsequent encounter S84  12XD                   Subjective: Patient noted left distal le, ankle and foot pain and paraesthesias persist constantly at a 4 of 10 pain and left le edema continues to increases as the day progresses  Objective: See treatment diary below  Patient to return to surgeon on 18  Reassessment on next PT visit  Assessment: Patient presents with improved tolerance to instrument aided STM techniques to reduce sensitivity at incision region  Patient presents with left le fatigue and gait dysfunction aggravation after standing based therapeutic exercises that mimics functional deficits  Plan: Cont as per plan of care              Precautions: Patient's PMHx is remarkable for DVT History, Hyperchloremia, left foot surgical intervention 3 months ago, multiple left foot fractures of left calcaneal tuberosity, left cuboid, left tibial stress fracture, left talar dome osteochondral lesion and left talofibular ligament tear  PRECAUTIONS: lower extremity pain aggravation      Daily Treatment Diary     Manual  6/15 6/21 6/26 6/28 7/3 7/5       Kinesio taping to left lateral ankle at malleolar region and distal lateral le incision in a basket weave at paper tape tension & Instrument Aided STM to all incisions at L LE  10 min 10 min  10 min  NT  15 min 15 min       Instrument Aided STM to all incisions at L LE     10 min                                                     Exercise Diary  6/15 6/21 6/22 6/26 6/28 7/3 7/5      bike  10 min  10 min  10 min  10 min 10 min 10 min      gastroc stretch on left supine  20sec 5x  20sec 5x  20sec 5x  20sec 5x  20sec x 5 20 sec x5      Hamstring stretch on left  20sec 5x  [id number]  20sec 5x  20sec 5x  20sec x 5 20 sec x 5      Ankle arom:L  20x  20x  20x NT  2 x 10 3 x 10      Standing gastroc stretch off step:B:      20 sec x 5 20 sec x 5      Standing gastroc stretch:L      20 sec x 5 20 sec x 5                   slr x 3 in standing:B:  20x  20x  20x 20x 2#  3#x20 3#x20      HR and TR:B:  20x  20x  20x 20x  2 x 10 3 x 10      Mini squats  20x 20x  20x 20x  2 x 10 2 x 10      lunges  NT  NT  20x 20x  2 x 10 on foam 2 x 10 on foam      Step ups:L and lat  6":  20x 20x  20x 20x 2#  NT 3#x20 on 8"      Step downs:L: 6"  NT  20x  20x NT  NT 2x10 with 3#      Side stepping with and without squats  6x inside bars 6x 6x 6x 2#  6x with 3# 6x with 3#      Backwards marching     6x  6x 2# NT NT      Tandem ambulation  6x inside bars  6x 6x 6x  6x with 3# 6x with 3#      [employer] balance system: limits of stability and maze   NT   3x each levels 5 to 8  NT  NT 5 x  Level 10      prostretch seated  20sec 5x  20sec 5x  20sec 5x  NT  NT NT      baps seated  NT  NT Level 2   P/D  Ev/In 20x  NT  NT NT      HR and TR:B: seated  30x  30x  NT  NT  NT NT      LAQ      2# 20x NT NT      SAQ      2# 20x NT NT                                    Modalities              CP supine to R foot on wedge

## 2018-07-10 ENCOUNTER — OFFICE VISIT (OUTPATIENT)
Dept: PHYSICAL THERAPY | Age: 58
End: 2018-07-10
Payer: COMMERCIAL

## 2018-07-10 DIAGNOSIS — S84.12XD INJURY OF LEFT PERONEAL NERVE, SUBSEQUENT ENCOUNTER: Primary | ICD-10-CM

## 2018-07-10 PROCEDURE — 97110 THERAPEUTIC EXERCISES: CPT

## 2018-07-10 PROCEDURE — 97140 MANUAL THERAPY 1/> REGIONS: CPT

## 2018-07-10 NOTE — PROGRESS NOTES
Daily Note     Today's date: 7/10/2018  Patient name: Asael Juarez  : 1960  MRN: [id number]  Referring provider: Yosi Valderrama MD  Dx:   Encounter Diagnosis     ICD-10-CM    1  Injury of left peroneal nerve, subsequent encounter S84  12XD                   Subjective: Pt states that his ankle has still been swollen and painful  He states he tried to play horseshoes and that seemed to aggravate it  Objective: See treatment diary below  Patient to return to surgeon on 18  Reassessment on next PT visit  Assessment: Patient tolerates session with few complaints  Most difficulty noted with step downs due to increased pain  Tenderness noted with IASTM  Plan: Cont as per plan of care              Precautions: Patient's PMHx is remarkable for DVT History, Hyperchloremia, left foot surgical intervention 3 months ago, multiple left foot fractures of left calcaneal tuberosity, left cuboid, left tibial stress fracture, left talar dome osteochondral lesion and left talofibular ligament tear  PRECAUTIONS: lower extremity pain aggravation      Daily Treatment Diary     Manual  6/15 6/21 6/26 6/28 7/3 7/5 7/10      Kinesio taping to left lateral ankle at malleolar region and distal lateral le incision in a basket weave at paper tape tension & Instrument Aided STM to all incisions at L LE  10 min 10 min  10 min  NT  15 min 15 min       Instrument Aided STM to all incisions at L LE     10 min                                                     Exercise Diary  6/15 6/21 6/22 6/26 6/28 7/3 7/5 7/10     bike  10 min  10 min  10 min  10 min 10 min 10 min 10 min     gastroc stretch on left supine  20sec 5x  20sec 5x  20sec 5x  20sec 5x  20sec x 5 20 sec x5 20 sec x5     Hamstring stretch on left  20sec 5x  [id number]  20sec 5x  20sec 5x  20sec x 5 20 sec x 5 20 sec x5     Ankle arom:L  20x  20x  20x NT  2 x 10 3 x 10 3x10     Standing gastroc stretch off step:B:      20 sec x 5 20 sec x 5 Standing gastroc stretch:L      20 sec x 5 20 sec x 5                   slr x 3 in standing:B:  20x  20x  20x 20x 2#  3#x20 3#x20 3# x20     HR and TR:B:  20x  20x  20x 20x  2 x 10 3 x 10 3x10     Mini squats  20x 20x  20x 20x  2 x 10 2 x 10 2x10     lunges  NT  NT  20x 20x  2 x 10 on foam 2 x 10 on foam 2x10 on foam     Step ups:L and lat  6":  20x 20x  20x 20x 2#  NT 3#x20 on 8" 3# x 20   8"     Step downs:L: 6"  NT  20x  20x NT  NT 2x10 with 3# 2x10 with 3#     Side stepping with and without squats  6x inside bars 6x 6x 6x 2#  6x with 3# 6x with 3# 6x with 3#     Backwards marching     6x  6x 2# NT NT NT     Tandem ambulation  6x inside bars  6x 6x 6x  6x with 3# 6x with 3# 6x with 3#     [employer] balance system: limits of stability and maze   NT   3x each levels 5 to 8  NT  NT 5 x  Level 10      prostretch seated  20sec 5x  20sec 5x  20sec 5x  NT  NT NT NT     baps seated  NT  NT Level 2   P/D  Ev/In 20x  NT  NT NT NT     HR and TR:B: seated  30x  30x  NT  NT  NT NT NT     LAQ      2# 20x NT NT NT     SAQ      2# 20x NT NT NT                                   Modalities              CP supine to R foot on wedge

## 2018-07-12 ENCOUNTER — OFFICE VISIT (OUTPATIENT)
Dept: PHYSICAL THERAPY | Age: 58
End: 2018-07-12
Payer: COMMERCIAL

## 2018-07-12 DIAGNOSIS — S84.12XD INJURY OF LEFT PERONEAL NERVE, SUBSEQUENT ENCOUNTER: Primary | ICD-10-CM

## 2018-07-12 PROCEDURE — G8979 MOBILITY GOAL STATUS: HCPCS | Performed by: PHYSICAL MEDICINE & REHABILITATION

## 2018-07-12 PROCEDURE — G8978 MOBILITY CURRENT STATUS: HCPCS | Performed by: PHYSICAL MEDICINE & REHABILITATION

## 2018-07-12 PROCEDURE — 97110 THERAPEUTIC EXERCISES: CPT | Performed by: PHYSICAL MEDICINE & REHABILITATION

## 2018-07-12 PROCEDURE — 97112 NEUROMUSCULAR REEDUCATION: CPT | Performed by: PHYSICAL MEDICINE & REHABILITATION

## 2018-07-12 NOTE — PROGRESS NOTES
PT Re-Evaluation     Today's date: 2018  Patient name: Vince Chen  : 1960  MRN: [id number]  Referring provider: Laila Kilgore MD  Dx:   Encounter Diagnosis     ICD-10-CM    1  Injury of left peroneal nerve, subsequent encounter S84  12XD                   Assessment  Impairments: abnormal gait, abnormal or restricted ROM, impaired physical strength and pain with function    Assessment details: PT IE 06-, RE 18  Patient's PMHx is remarkable for DVT History, Hyperchloremia, left foot surgical intervention 3 months ago, multiple left foot fractures of left calcaneal tuberosity, left cuboid, left tibial stress fracture, left talar dome osteochondral lesion and left talofibular ligament tear  PRECAUTIONS: lower extremity pain aggravation  Surgical / Admission Date: 2018    Surgical Admitting Diagnoses: 1)  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, left leg, sequela (S94 32XS)  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)  2) Other specified mononeuropathies of left lower limb (G57 82)   Patient presents with continued significant pain, restrictions in ankle ROM, ambulation dysfunction, decreased strength, edema provoked by weightbearing activity,  reduced capacity for ADL performance following injury and surgical intervention 18  Patient has made slight progress towards established goals and would benefit from continued skilled intervention to address remaining deficits and maximize return to PLOF, safe return to work  Thank you for this referral    Understanding of Dx/Px/POC: good   Prognosis: good    Goals  Short Term goals - 4 weeks  1  Patient will be independent HEP  -met  2  Patient will report a 25 - 50% decrease in pain complaints  - not met  3  Increase strength 1/2 grade  - partially met  4  Increase ROM 5-10 degrees  - Partially met    Long Term goals - 8 weeks  1  Patient will report elimination of pain complaints    2   Patient will return to all work related activities without restriction  3   Patient will return to all recreational activities without restriction  4   ROM WFL  5   Strength 5/5  Plan  Patient would benefit from: skilled physical therapy  Planned modality interventions: cryotherapy, TENS, thermotherapy: hydrocollator packs and unattended electrical stimulation  Planned therapy interventions: IADL retraining, joint mobilization, manual therapy, activity modification, ADL retraining, ADL training, aquatic therapy, balance, balance/weight bearing training, muscle pump exercises, neuromuscular re-education, patient education, self care, compression, strengthening, stretching, therapeutic activities, therapeutic exercise, therapeutic training, transfer training, flexibility, functional ROM exercises, gait training, graded activity, graded exercise, graded motor and home exercise program  Frequency: 2x week  Duration in weeks: 12  Treatment plan discussed with: patient        Subjective Evaluation    History of Present Illness  Mechanism of injury: RE 7/12  Patient presents with continued complaints of pain and stiffness in the left foot  Patient notes minimal improvement since beginning therapy, continues with difficulty with ADLs, stairclimbing, equal weightbearing  Patient notes minimal improvement in numbness, increased edema with weightbearing activity over incision sites with (+) hypersensitivity  Patient reports he was recently able to transition to wearing a sneaker with mild discomfort  PT IE 06-  Patient noted the following deficits still persist after surgical intervention: prolonged walking, stair climbing, static standing functional activities, bending and lifting activities with left UE that promote full left le weight baring, left ankle and foot pain  Patient noted constant stiffness persists in left ankle and foot  Patient noted sitting and lying reduced left distal le and foot pain at 3 of 10    Patient noted he continues to get intermittent bouts of sharp pain that will occur at rest and with walking  Patient noted he still unable to wear a full shoe or boot since surgical intervention  Patient noted walking, stair climbing, full weight baring on left le as pain aggravating up to 8 of 10  Quality of life: good    Pain  At best pain ratin  At worst pain ratin  Location: left distal le, ankle and foot region  Quality: throbbing and sharp  Relieving factors: medications  Aggravating factors: standing, stair climbing, walking and lifting    Patient Goals  Patient goals for therapy: decreased edema, decreased pain, improved balance, increased motion, increased strength and independence with ADLs/IADLs  Patient goal: Patient's goal:" to get back to work and regular house hold iadls"  Objective     Tenderness     Additional Tenderness Details  TTP and light touch over incision sites, most significantly at lateral ankle      Active Range of Motion   Left Ankle/Foot   Dorsiflexion (ke): 0 degrees   Plantar flexion: 40 degrees   Inversion: 10 degrees   Eversion: 10 degrees     Right Ankle/Foot   Dorsiflexion (ke): 10 degrees   Plantar flexion: 50 degrees   Inversion: 40 degrees   Eversion: 10 degrees     Passive Range of Motion   Left Ankle/Foot    Dorsiflexion (ke): 5 degrees   Inversion: 15 degrees   Eversion: 13 degrees     Additional Passive Range of Motion Details  Increased discomfort with end range PROM    Strength/Myotome Testing     Left Ankle/Foot   Dorsiflexion: 4+  Plantar flexion: 4+  Inversion: 4  Eversion: 4    Right Ankle/Foot   Dorsiflexion: 4+  Plantar flexion: 5  Inversion: 5  Eversion: 4+    Ambulation     Ambulation: Level Surfaces   Ambulation without assistive device: independent    Additional Level Surfaces Ambulation Details  Patient ambulates with decrease in pace, left > right toe out and decrease in right sided weight shift with left sided weight bearing - persists at RE  Without shoes: minimal heel strike, toe extension, push off  Ambulation: Stairs   Ascend stairs: independent  Pattern: non-reciprocal  Railings: one rail  Descend stairs: independent  Pattern: reciprocal  Railings: one rail    Comments   TU 95 sec,  mCTSIB: Eyes Open on Firm Surface with patient sway index at 1 43 (RE 1 60) ; Eyes Closed on Firm Surface with patient sway index at 1 87 (RE 1 28) ; Eyes Open on Foam Surface with patient sway index at 1 31 (RE 1 73) ; Eyes Closed on Firm Surface with patient sway index at 2 13 (RE 2 46)  Significant weight shift to RLE throughout without cueing  General Comments     Ankle/Foot Comments   Girth Measurements: Ankle:  6 CM above lateral malleolar region: Right at 22 3 CM and left at 24 0 Cm (21 5 at RE); Bimalleolar region: Right at 26 7 CM and left at 27 2 Cm (26 at RE);  1 st MTP Joint region: Right at 25 1 CM and left at 25 2 Cm (24 at RE); Figure 8 region: Right at 54 1 CM and left at 54 9 CM (54 at RE)  Precautions: Patient's PMHx is remarkable for DVT History, Hyperchloremia, left foot surgical intervention 3 months ago, multiple left foot fractures of left calcaneal tuberosity, left cuboid, left tibial stress fracture, left talar dome osteochondral lesion and left talofibular ligament tear  PRECAUTIONS: lower extremity pain aggravation      Daily Treatment Diary     Manual  6/15 6/21 6/26 6/28 7/3 7/5 7/10 7/12     Kinesio taping to left lateral ankle at malleolar region and distal lateral le incision in a basket weave at paper tape tension & Instrument Aided STM to all incisions at L LE  10 min 10 min  10 min  NT  15 min 15 min  NP     Instrument Aided STM to all incisions at L LE     10 min                                                     Exercise Diary  6/15 6/21 6/22 6/26 6/28 7/3 7/5 710     bike  10 min  10 min  10 min  10 min 10 min 10 min 10 min     gastroc stretch on left supine  20sec 5x  20sec 5x  20sec 5x  20sec 5x 20sec x 5 20 sec x5 20 sec x5     Hamstring stretch on left  20sec 5x  [id number]  20sec 5x  20sec 5x  20sec x 5 20 sec x 5 20 sec x5     Ankle arom:L  20x  20x  20x NT  2 x 10 3 x 10 3x10     Standing gastroc stretch off step:B:      20 sec x 5 20 sec x 5      Standing gastroc stretch:L      20 sec x 5 20 sec x 5                   slr x 3 in standing:B:  20x  20x  20x 20x 2#  3#x20 3#x20 3# x20     HR and TR:B:  20x  20x  20x 20x  2 x 10 3 x 10 3x10     Mini squats  20x 20x  20x 20x  2 x 10 2 x 10 2x10 2x10    lunges  NT  NT  20x 20x  2 x 10 on foam 2 x 10 on foam 2x10 on foam 2x10 foam no UE    Step ups:L and lat  6":  20x 20x  20x 20x 2#  NT 3#x20 on 8" 3# x 20   8"     Step downs:L: 6"  NT  20x  20x NT  NT 2x10 with 3# 2x10 with 3#     Side stepping with and without squats  6x inside bars 6x 6x 6x 2#  6x with 3# 6x with 3# 6x with 3#     Backwards marching     6x  6x 2# NT NT NT     Tandem ambulation  6x inside bars  6x 6x 6x  6x with 3# 6x with 3# 6x with 3#     biodex balance system: limits of stability and maze   NT   3x each levels 5 to 8  NT  NT 5 x  Level 10  3x ea, L10    prostretch seated  20sec 5x  20sec 5x  20sec 5x  NT  NT NT NT     baps seated  NT  NT Level 2   P/D  Ev/In 20x  NT  NT NT NT     HR and TR:B: seated  30x  30x  NT  NT  NT NT NT     LAQ      2# 20x NT NT NT     SAQ      2# 20x NT NT NT     Biodex weight shift: AP/ML         Static, 1' ea                     Modalities              CP supine to R foot on wedge

## 2018-07-20 ENCOUNTER — APPOINTMENT (OUTPATIENT)
Dept: PHYSICAL THERAPY | Age: 58
End: 2018-07-20
Payer: COMMERCIAL

## 2018-07-23 ENCOUNTER — OFFICE VISIT (OUTPATIENT)
Dept: PHYSICAL THERAPY | Age: 58
End: 2018-07-23
Payer: COMMERCIAL

## 2018-07-23 DIAGNOSIS — S84.12XD INJURY OF LEFT PERONEAL NERVE, SUBSEQUENT ENCOUNTER: Primary | ICD-10-CM

## 2018-07-23 PROCEDURE — 97110 THERAPEUTIC EXERCISES: CPT

## 2018-07-23 PROCEDURE — 97112 NEUROMUSCULAR REEDUCATION: CPT

## 2018-07-23 NOTE — PROGRESS NOTES
Daily Note     Today's date: 2018  Patient name: Livia Olguin  : 1960  MRN: [id number]  Referring provider: Ale Leon MD  Dx:   Encounter Diagnosis     ICD-10-CM    1  Injury of left peroneal nerve, subsequent encounter S84  12XD                   Subjective: Pt reported returning to PT as per last MD fu  "I am still in pain and the doc wants me to get a brace for my foot"       Objective: See treatment diary below      Assessment: Pain with dorsiflexion and eversion mostly  Plan: Cont with plan of care  Precautions: Patient's PMHx is remarkable for DVT History, Hyperchloremia, left foot surgical intervention 3 months ago, multiple left foot fractures of left calcaneal tuberosity, left cuboid, left tibial stress fracture, left talar dome osteochondral lesion and left talofibular ligament tear  PRECAUTIONS: lower extremity pain aggravation      Daily Treatment Diary     Manual                 Kinesio taping to left lateral ankle at malleolar region and distal lateral le incision in a basket weave at paper tape tension & Instrument Aided STM to all incisions at L LE  NT             Instrument Aided STM to all incisions at L LE  NT                                                       Exercise Diary                bike 10 min             gastroc stretch on left supine 20sec 5x             Hamstring stretch on left 20sec 5x             Ankle arom:L NT             Standing gastroc stretch off step:B: 20sec 5x              Standing gastroc stretch:L NT                          slr x 3 in standing:B: 20x             HR and TR:B: 20x             Mini squats 20x             lunges 20x             Step ups:L and lat  6": 20x             Step downs:L: 6" 20x             Side stepping with and without squats NT             Backwards marching  NT             Tandem ambulation 5x inside bars             biodex balance system: limits of stability and maze  4x each level 10 prostretch seated NT             baps seated NT                           LAQ  20x             SAQ  NT             Biodex weight shift: AP/ML NT                              Modalities  7/23            CP supine to R foot on wedge  deferred

## 2018-07-26 ENCOUNTER — OFFICE VISIT (OUTPATIENT)
Dept: PHYSICAL THERAPY | Age: 58
End: 2018-07-26
Payer: COMMERCIAL

## 2018-07-26 DIAGNOSIS — S84.12XD INJURY OF LEFT PERONEAL NERVE, SUBSEQUENT ENCOUNTER: Primary | ICD-10-CM

## 2018-07-26 PROCEDURE — 97110 THERAPEUTIC EXERCISES: CPT

## 2018-07-26 PROCEDURE — 97112 NEUROMUSCULAR REEDUCATION: CPT

## 2018-07-26 NOTE — PROGRESS NOTES
Daily Note     Today's date: 2018  Patient name: Caesar Gómez  : 1960  MRN: [id number]  Referring provider: Eddie Garcia MD  Dx:   Encounter Diagnosis     ICD-10-CM    1  Injury of left peroneal nerve, subsequent encounter S84  12XD                   Subjective: Pt reported continued L LE and L foot pain 4/10 today       Objective: See treatment diary below      Assessment: Pain with dorsiflexion and eversion mostly, increased pain to 7/10 after PT session  Plan: Cont with plan of care  Precautions: Patient's PMHx is remarkable for DVT History, Hyperchloremia, left foot surgical intervention 3 months ago, multiple left foot fractures of left calcaneal tuberosity, left cuboid, left tibial stress fracture, left talar dome osteochondral lesion and left talofibular ligament tear  PRECAUTIONS: lower extremity pain aggravation      Daily Treatment Diary     Manual                Kinesio taping to left lateral ankle at malleolar region and distal lateral le incision in a basket weave at paper tape tension & Instrument Aided STM to all incisions at L LE  NT  NT            Instrument Aided STM to all incisions at L LE  NT NT                                                       Exercise Diary              bike 10 min  10 min            gastroc stretch on left supine 20sec 5x  20sec 5x            Hamstring stretch on left 20sec 5x  20sec 5x            Ankle arom:L NT  HEP           Standing gastroc stretch off step:B: 20sec 5x   20sec 5x           Standing gastroc stretch:L NT  20sec 5x                         slr x 3 in standing:B: 20x  20x 2#            HR and TR:B: 20x  20x            Mini squats 20x  20x            lunges 20x  NT            Step ups:L and lat  6": 20x  20x 2#           Step downs:L: 6" 20x  NT            Side stepping with and without squats NT  NT            Backwards marching  NT  NT            Tandem ambulation 5x inside bars  NT            biodex balance system: limits of stability and maze  4x each level 10   5x each level 10             prostretch seated NT  20sec 5x            baps seated4 planes  NT  20x                          LAQ  20x  20x 2#            SAQ  NT  NT            Biodex weight shift: AP/ML NT  NT                             Modalities  7/23 7/26           CP supine to R foot on wedge  deferred  deferred

## 2018-07-31 ENCOUNTER — OFFICE VISIT (OUTPATIENT)
Dept: PHYSICAL THERAPY | Age: 58
End: 2018-07-31
Payer: COMMERCIAL

## 2018-07-31 DIAGNOSIS — S84.12XD INJURY OF LEFT PERONEAL NERVE, SUBSEQUENT ENCOUNTER: Primary | ICD-10-CM

## 2018-07-31 PROCEDURE — 97112 NEUROMUSCULAR REEDUCATION: CPT

## 2018-07-31 PROCEDURE — 97110 THERAPEUTIC EXERCISES: CPT

## 2018-07-31 NOTE — PROGRESS NOTES
`Daily Note     Today's date: 2018  Patient name: Jose Alejandro Fraser  : 1960  MRN: [id number]  Referring provider: Reynaldo Youssef MD  Dx:   Encounter Diagnosis     ICD-10-CM    1  Injury of left peroneal nerve, subsequent encounter S84  12XD                   Subjective: Pt reported continued L LE and L foot pain 6/10 today  Objective: See treatment diary below      Assessment: Increased pain after progression  Plan: Cont with plan of care  Precautions: Patient's PMHx is remarkable for DVT History, Hyperchloremia, left foot surgical intervention 3 months ago, multiple left foot fractures of left calcaneal tuberosity, left cuboid, left tibial stress fracture, left talar dome osteochondral lesion and left talofibular ligament tear  PRECAUTIONS: lower extremity pain aggravation      Daily Treatment Diary     Manual               Kinesio taping to left lateral ankle at malleolar region and distal lateral le incision in a basket weave at paper tape tension & Instrument Aided STM to all incisions at L LE  NT  NT  NT           Instrument Aided STM to all incisions at L LE  NT NT  10 min                                                      Exercise Diary             bike 10 min  10 min  10 min           gastroc stretch on left supine 20sec 5x  20sec 5x  20sec 5x           Hamstring stretch on left 20sec 5x  20sec 5x  20sec 5x           Ankle arom:L NT  HEP HEP           Standing gastroc stretch off step:B: 20sec 5x   20sec 5x 20sec 5x           Standing gastroc stretch:L NT  20sec 5x  20sec 5x                        slr x 3 in standing:B: 20x  20x 2#  NT           HR and TR:B: 20x  20x  20x           Mini squats 20x  20x  20x           lunges 20x  NT  NT           Step ups:L and lat  6": 20x  20x 2# NT           Step downs:L: 6" 20x  NT  NT           Side stepping with and without squats NT  NT  5x across the gym 25'          Backwards marching  NT  NT  NT Tandem ambulation 5x inside bars  NT  5x across the gym 25'          biodex balance system: limits of stability and maze  4x each level 10   5x each level 10   5x level 12          prostretch seated NT  20sec 5x  NT           baps seated4 planes  NT  20x  NT           TB all planes    Blue 30x           LAQ  20x  20x 2#  NT           SAQ  NT  NT  NT           Biodex weight shift: AP/ML NT  NT  NT           SLS    10sec 10x           Leg press    60# 20x          Leg press calf pump    50# 20x                            Modalities  7/23 7/26 7/31          CP supine to R foot on wedge  deferred  deferred deferred

## 2018-08-02 ENCOUNTER — OFFICE VISIT (OUTPATIENT)
Dept: PHYSICAL THERAPY | Age: 58
End: 2018-08-02
Payer: COMMERCIAL

## 2018-08-02 DIAGNOSIS — S84.12XD INJURY OF LEFT PERONEAL NERVE, SUBSEQUENT ENCOUNTER: Primary | ICD-10-CM

## 2018-08-02 PROCEDURE — G8978 MOBILITY CURRENT STATUS: HCPCS

## 2018-08-02 PROCEDURE — 97112 NEUROMUSCULAR REEDUCATION: CPT | Performed by: PHYSICAL THERAPIST

## 2018-08-02 PROCEDURE — G8979 MOBILITY GOAL STATUS: HCPCS

## 2018-08-02 PROCEDURE — 97110 THERAPEUTIC EXERCISES: CPT | Performed by: PHYSICAL THERAPIST

## 2018-08-02 NOTE — PROGRESS NOTES
`Daily Note     Today's date: 2018  Patient name: Eli Frey  : 1960  MRN: [id number]  Referring provider: Michael Bejarano MD  Dx:   Encounter Diagnosis     ICD-10-CM    1  Injury of left peroneal nerve, subsequent encounter S84  12XD                   Subjective: Pt believes symptoms continue to worsen  He follows up with surgeon next week  Objective: See treatment diary below      Assessment: Peroneal nerve glides initiated today, and patient instructed to perform 5x throughout the day  Graston also performed along the nerve tract to promote mobility  Patient       Plan: If patient observes improvement following today, patient to continue PT with emphasis on neural mobility  If patient continues to worsen, patient to hold therapy until follow up with physician  Precautions: Patient's PMHx is remarkable for DVT History, Hyperchloremia, left foot surgical intervention 3 months ago, multiple left foot fractures of left calcaneal tuberosity, left cuboid, left tibial stress fracture, left talar dome osteochondral lesion and left talofibular ligament tear  PRECAUTIONS: lower extremity pain aggravation  Daily Treatment Diary    patient educated on sensitization and techniques to manage this with surgical incisions       Manual    8/2           Kinesio taping to left lateral ankle at malleolar region and distal lateral le incision in a basket weave at paper tape tension & Instrument Aided STM to all incisions at L LE  NT  NT  NT           Instrument Aided STM to all incisions at L LE  NT NT  10 min  Peroneal nerve tract and incisions                                                    Exercise Diary   8/2          bike 10 min  10 min  10 min  10 min         gastroc stretch on left supine 20sec 5x  20sec 5x  20sec 5x           Hamstring stretch on left 20sec 5x  20sec 5x  20sec 5x           Ankle arom:L NT  HEP HEP           Standing gastroc stretch off step:B: 20sec 5x   20sec 5x 20sec 5x           Standing gastroc stretch:L NT  20sec 5x  20sec 5x                        slr x 3 in standing:B: 20x  20x 2#  NT           HR and TR:B: 20x  20x  20x           Mini squats 20x  20x  20x           lunges 20x  NT  NT           Step ups:L and lat  6": 20x  20x 2# NT           Step downs:L: 6" 20x  NT  NT           Side stepping with and without squats NT  NT  5x across the gym 25'          Backwards marching  NT  NT  NT           Tandem ambulation 5x inside bars  NT  5x across the gym 25'          biodex balance system: limits of stability and maze  4x each level 10   5x each level 10   5x level 12          prostretch seated NT  20sec 5x  NT           baps seated4 planes  NT  20x  NT           TB all planes    Blue 30x           LAQ  20x  20x 2#  NT           SAQ  NT  NT  NT           Biodex weight shift: AP/ML NT  NT  NT           SLS    10sec 10x           Leg press    60# 20x          Leg press calf pump    50# 20x           Peroneal nerve glides    3 x 15 (therapist assisted in supine) and 10x seated              Modalities  7/23 7/26 7/31          CP supine to R foot on wedge  deferred  deferred deferred
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
## 2019-06-14 ENCOUNTER — OFFICE VISIT (OUTPATIENT)
Dept: PODIATRY | Facility: CLINIC | Age: 59
End: 2019-06-14
Payer: COMMERCIAL

## 2019-06-14 VITALS
WEIGHT: 165 LBS | HEIGHT: 65 IN | SYSTOLIC BLOOD PRESSURE: 124 MMHG | BODY MASS INDEX: 27.49 KG/M2 | DIASTOLIC BLOOD PRESSURE: 74 MMHG

## 2019-06-14 DIAGNOSIS — S94.32XS: ICD-10-CM

## 2019-06-14 DIAGNOSIS — M21.372 FOOT DROP, LEFT: Primary | ICD-10-CM

## 2019-06-14 PROCEDURE — 99213 OFFICE O/P EST LOW 20 MIN: CPT | Performed by: PODIATRIST

## 2019-12-20 ENCOUNTER — OFFICE VISIT (OUTPATIENT)
Dept: PODIATRY | Facility: CLINIC | Age: 59
End: 2019-12-20
Payer: COMMERCIAL

## 2019-12-20 VITALS
BODY MASS INDEX: 27.99 KG/M2 | SYSTOLIC BLOOD PRESSURE: 129 MMHG | HEIGHT: 65 IN | DIASTOLIC BLOOD PRESSURE: 91 MMHG | HEART RATE: 72 BPM | WEIGHT: 168 LBS

## 2019-12-20 DIAGNOSIS — M21.372 FOOT DROP, LEFT: Primary | ICD-10-CM

## 2019-12-20 DIAGNOSIS — S94.32XS: ICD-10-CM

## 2019-12-20 PROCEDURE — 99213 OFFICE O/P EST LOW 20 MIN: CPT | Performed by: PODIATRIST

## 2019-12-20 RX ORDER — ROSUVASTATIN CALCIUM 5 MG/1
TABLET, COATED ORAL
COMMUNITY
End: 2019-12-20 | Stop reason: SDUPTHER

## 2019-12-20 NOTE — PROGRESS NOTES
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, sequela               PLAN:  Patient was counseled and educated on the condition and the diagnosis  The diagnosis, treatment options and prognosis were discussed with the patient  Evaluated his brace  His symptoms and function are stable with the brace  Instructed supportive care, home exercise, warm soaking, and proper footwear  Discussed possible further injection depending on the progress  His symptoms are stable at this time and he may return if his condition gets worse  Subjective:       HPI  The patient presents for evaluation of left foot pain  His symptoms are stable with modified MAFO  Pain is about 3-4 out of 10 on average  No pain at resting  Some numbness on left foot  Improved function with MAFO  The following portions of the patient's history were reviewed and updated as appropriate: allergies, current medications, past family history, past medical history, past social history, past surgical history and problem list   All pertinent labs and images were reviewed        Past Medical History  Past Medical History:   Diagnosis Date    Accident at workplace 07/20/2016    electrocuted and fell 10 ft approx    Anesthesia     "told coughing a little bit after anesthesia"    Arthritis     hands    Balance problem     occas    DVT (deep venous thrombosis) (HCC)     x3, "off of eliquis for over a year"    Hernia     patient unsure of which type of hernia    Hyperlipidemia     Hypertension     Left ankle pain     Left leg pain     Numbness and tingling in right hand     5/1/18 pt reports no longer a problem    Post-nasal drip     with" cough from spring allergies"    Risk for falls     Seasonal allergies     Unstable gait     Wears glasses     Wears glasses        Past Surgical History  Past Surgical History: Procedure Laterality Date    HERNIA REPAIR      mesh implant    PERONEAL NERVE DECOMPRESSION Left 5/4/2018    Procedure: DECOMPRESSION COMMON PERONEAL NERVE, SUPERFICIAL PERONEAL NERVE AND SURAL NERVE NEUROLYSIS WITH IMPLANTATION  OF NERVE STUMP TO MUSCLE;  Surgeon: Hector Omalley DPM;  Location: [facility];  Service: Podiatry    SD OPEN TREATMENT CALCANEAL FRACTURE Left 9/8/2017    Procedure: Zofia Goss;  Surgeon: Flaco Taveras DPM;  Location: [facility];  Service: Podiatry        Allergies:  Patient has no known allergies  Medications:  Current Outpatient Medications   Medication Sig Dispense Refill    atorvastatin (LIPITOR) 10 mg tablet Take 10 mg by mouth daily      ascorbic acid (VITAMIN C) 500 mg tablet Take 500 mg by mouth daily      Multiple Vitamins-Minerals (MULTIVITAMIN WITH MINERALS) tablet Take 1 tablet by mouth daily      NICOTINE TD Place on the skin daily Changes daily      Omega-3 Fatty Acids (FISH OIL CONCENTRATE PO) Take 300 mg by mouth daily       No current facility-administered medications for this visit          Social History:  Social History     Socioeconomic History    Marital status: /Civil Union     Spouse name: None    Number of children: None    Years of education: None    Highest education level: None   Occupational History    None   Social Needs    Financial resource strain: None    Food insecurity:     Worry: None     Inability: None    Transportation needs:     Medical: None     Non-medical: None   Tobacco Use    Smoking status: Current Some Day Smoker     Types: Cigarettes    Smokeless tobacco: Never Used    Tobacco comment: 5/10 cigarettes a day/using patch/trying to quit for surgery   Substance and Sexual Activity    Alcohol use: Yes     Comment: socially    Drug use: No    Sexual activity: None   Lifestyle    Physical activity:     Days per week: None     Minutes per session: None    Stress: None   Relationships    Social connections:     Talks on phone: None     Gets together: None     Attends [employer] service: None     Active member of club or organization: None     Attends meetings of clubs or organizations: None     Relationship status: None    Intimate partner violence:     Fear of current or ex partner: None     Emotionally abused: None     Physically abused: None     Forced sexual activity: None   Other Topics Concern    None   Social History Narrative    None          Review of Systems   Constitutional: Negative for chills and fever  Respiratory: Negative for cough and shortness of breath  Cardiovascular: Negative for chest pain  Gastrointestinal: Negative for diarrhea, nausea and vomiting  Skin: Negative for rash and wound  Neurological: Negative for headaches  Psychiatric/Behavioral: Negative for behavioral problems and confusion  Objective:      /91   Pulse 72   Ht 5' 5" (1 651 m)   Wt 76 2 kg (168 lb)   BMI 27 96 kg/m²          Physical Exam   Constitutional: He is oriented to person, place, and time  He appears well-developed and well-nourished  No distress  Cardiovascular: Normal rate, regular rhythm and intact distal pulses  Pulmonary/Chest: Effort normal and breath sounds normal  No respiratory distress  Musculoskeletal:   Decreased MMT left foot dorsiflexion (4/5)  No acute edema  Minimal pain around sinus tarsi left foot  Neurological: He is alert and oriented to person, place, and time  He exhibits normal muscle tone  Skin: Skin is warm  No erythema  No pallor  Psychiatric: He has a normal mood and affect  His behavior is normal    Vitals reviewed

## 2021-12-15 NOTE — DISCHARGE INSTRUCTIONS
Podiatry: You may weightbear as tolerated to the left lower extremity with use of a regular shoe  Keep dressing clean, dry, and intact until follow-up with Dr Natacha Park  Take pain medication as needed  Cigarette Smoking and Your Health, Ambulatory Care   GENERAL INFORMATION:   What are the risks to my health if I smoke? Chemicals in tobacco are addictive and damage every cell in your body  All tobacco products are dangerous to you and to nonsmokers who breathe your secondhand smoke  Even if you are a light smoker or a social smoker, you have an increased risk for cancer, heart disease, and lung disease  If you are pregnant or have diabetes, smoking increases your risk for complications  What are the benefits to my health if I stop smoking? · Lower risk of cancer, heart disease, blood clots, heart attack, and stroke    · Lower risk of diabetes complications, such as kidney, artery, or eye disease, and nerve damage that can result in amputations    · Lower risk of lung infections and diseases, such as pneumonia, asthma, chronic bronchitis, and emphysema           · Increased benefits of chemotherapy if you already have cancer, and decreased risk for cancer returning after treatment    · Improved circulation, allowing more oxygen to be delivered to your body    · Improved ability to heal and to fight infections  What are the benefits to the health of others if I stop smoking? · Lower risk of lung cancer and heart disease in nonsmokers    · Lower risk of miscarriage, early delivery, low birth weight, and stillbirth    · Lower risk of SIDS, obesity, developmental delay, ear infections, colds, pneumonia, bronchitis, asthma, and ADHD in your child  Where can I find more information and support to stop smoking? It is never too late to stop smoking  Ask your healthcare provider for more information if you need help  · [employer]  Phone: 4- 525 - 396-5496  Web Address: www [employer]  Follow up with your healthcare provider as directed:  Write down your questions so you remember to ask them during your visits  CARE AGREEMENT:   You have the right to help plan your care  Learn about your health condition and how it may be treated  Discuss treatment options with your caregivers to decide what care you want to receive  You always have the right to refuse treatment  The above information is an  only  It is not intended as medical advice for individual conditions or treatments  Talk to your doctor, nurse or pharmacist before following any medical regimen to see if it is safe and effective for you  © 2014 [address] is for End User's use only and may not be sold, redistributed or otherwise used for commercial purposes  All illustrations and images included in CareNotes® are the copyrighted property of [employer]  or T[employer]  How to Stop Smoking   WHAT YOU NEED TO KNOW:   You will improve your health and the health of others around you if you stop smoking  Your risk for heart and lung disease, cancer, stroke, heart attack, and vision problems will also decrease  You can benefit from quitting no matter how long you have smoked  DISCHARGE INSTRUCTIONS:   Prepare to stop smoking:  Nicotine is a highly addictive drug found in cigarettes  Withdrawal symptoms can happen when you stop smoking and make it hard to quit  These include anxiety, depression, irritability, trouble sleeping, and increased appetite  You increase your chances of success if you prepare to quit  · Set a quit date  Shanique Jay a date that is within the next 2 weeks  Do not pick a day that you think may be stressful or busy  Write down the day or [place] it on your calender  · Tell friends and family that you plan to quit  Explain that you may have withdrawal symptoms when you try to quit  Ask them to support you   They may be able to encourage you and help reduce your stress to make it easier for you to quit  · Make a list of your reasons for quitting  Put the list somewhere you will see it every day, such as your refrigerator  You can look at the list when you have a craving  · Remove all tobacco and nicotine products from your home, car, and workplace  Also, remove anything else that will tempt you to smoke, such as lighters, matches, or ashtrays  Clean your car, home, and places at work that smell like smoke  The smell of smoke can trigger a craving  · Identify triggers that make you want to smoke  This may include activities, feelings, or people  Also write down 1 way you can deal with each of your triggers  For example, if you want to smoke as soon as you wake up, plan another activity during this time, such as exercise  · Make a plan for how you will quit  Learn about the tools that can help you quit, such as medicine, counseling, or nicotine replacement therapy  Choose at least 2 options to help you quit  Tools to help you stop smoking:   · Counseling  from a trained healthcare provider can provide you with support and skills to quit smoking  The provider will also teach you to manage your withdrawal symptoms and cravings  You may receive counseling from one counselor, in group therapy, or through phone therapy called a quit line  · Nicotine replacement therapy (NRT)  such as nicotine patches, gum, or lozenges may help reduce your nicotine cravings  You may get these without a doctor's order  Do not use e-cigarettes or smokeless tobacco in place of cigarettes or to help you quit  They still contain nicotine  · Prescription medicines  such as nasal sprays or nicotine inhalers may help reduce your withdrawal symptoms  Other medicines may also be used to reduce your urge to smoke  Ask your healthcare provider about these medicines  You may need to start certain medicines 2 weeks before your quit date for them to work well       · Hypnosis  is a practice that helps guide you through thoughts and feelings  Hypnosis may help decrease your cravings and make you more willing to quit  · Acupuncture therapy  uses very thin needles to balance energy channels in the body  This is thought to help decrease cravings and symptoms of nicotine withdrawal      · Support groups  let you talk to others who are trying to quit or have already quit  It may be helpful to speak with others about how they quit  Manage your cravings:   · Avoid situations, people, and places that tempt you to smoke  Go to nonsmoking places, such as libraries or restaurants  Understand what tempts you and try to avoid these things  · Keep your hands busy  Hold things such as a stress ball or pen  · Put candy or toothpicks in your mouth  Keep lollipops, sugarless gum, or toothpicks with you at all times  · Do not have alcohol or caffeine  These drinks may tempt you to smoke  Drink healthy liquids such as water or juice instead  · Reward yourself when you resist your cravings  Rewards will motivate you and help you stay positive  · Do an activity that distracts you from your craving  Examples include going for a walk, exercising, or cleaning  Prevent weight gain after you quit:  You may gain a few pounds after you quit smoking  It is healthier for you to gain a few pounds than to continue to smoke  The following can help you prevent weight gain:  · Eat healthy foods  These include fruits, vegetables, whole-grain breads, low-fat dairy products, beans, lean meats, and fish  Eat healthy snacks, such as low-fat yogurt, if you get hungry between meals  · Drink water before, during, and between meals  This will make your stomach feel full and help prevent you from overeating  Ask your healthcare provider how much liquid to drink each day and which liquids are best for you  · Exercise  Take a walk or do some kind of exercise every day   Ask your healthcare provider what exercise is right for you  This may help reduce your cravings and reduce stress  For support and more information:   · [employer]  Phone: 7- 812 - 339-7188  Web Address: www smokefree  [employer]  © 2017 [address] is for End User's use only and may not be sold, redistributed or otherwise used for commercial purposes  All illustrations and images included in CareNotes® are the copyrighted property of [employer] Inc  or Titi Peters  The above information is an  only  It is not intended as medical advice for individual conditions or treatments  Talk to your doctor, nurse or pharmacist before following any medical regimen to see if it is safe and effective for you  pt wants to get stronger

## (undated) DEVICE — SYRINGE 10ML LL

## (undated) DEVICE — CHLORAPREP HI-LITE 26ML ORANGE

## (undated) DEVICE — 3M™ STERI-STRIP™ REINFORCED ADHESIVE SKIN CLOSURES, R1546, 1/4 IN X 4 IN (6 MM X 100 MM), 10 STRIPS/ENVELOPE: Brand: 3M™ STERI-STRIP™

## (undated) DEVICE — NEEDLE 25G X 1 1/2

## (undated) DEVICE — KERLIX BANDAGE ROLL: Brand: KERLIX

## (undated) DEVICE — ACE WRAP 4 IN UNSTERILE

## (undated) DEVICE — SUT PROLENE 6-0 P-3 18 IN 8695G

## (undated) DEVICE — ACE WRAP 6 IN UNSTERILE

## (undated) DEVICE — GLOVE INDICATOR PI UNDERGLOVE SZ 7.5 BLUE

## (undated) DEVICE — CUFF TOURNIQUET 30 X 4 IN QUICK CONNECT DISP 1BLA

## (undated) DEVICE — 3M™ TEGADERM™ TRANSPARENT FILM DRESSING FRAME STYLE, 1626W, 4 IN X 4-3/4 IN (10 CM X 12 CM), 50/CT 4CT/CASE: Brand: 3M™ TEGADERM™

## (undated) DEVICE — WEBRIL 6 IN UNSTERILE

## (undated) DEVICE — ABDOMINAL PAD: Brand: DERMACEA

## (undated) DEVICE — CAST PADDING 4 IN SYNTHETIC NON-STRL

## (undated) DEVICE — 2000CC GUARDIAN II: Brand: GUARDIAN

## (undated) DEVICE — GLOVE SRG BIOGEL 7.5

## (undated) DEVICE — DRAPE C-ARM X-RAY

## (undated) DEVICE — CUFF TOURNIQUET 18 X 4 IN QUICK CONNECT DISP 1 BLADDER

## (undated) DEVICE — UNIVERSAL  MINOR EXTREMITY PK: Brand: CARDINAL HEALTH

## (undated) DEVICE — PADDING CAST 4 IN  COTTON STRL

## (undated) DEVICE — INTENDED FOR TISSUE SEPARATION, AND OTHER PROCEDURES THAT REQUIRE A SHARP SURGICAL BLADE TO PUNCTURE OR CUT.: Brand: BARD-PARKER ® CARBON RIB-BACK BLADES

## (undated) DEVICE — TUBING SUCTION 5MM X 12 FT

## (undated) DEVICE — OCCLUSIVE GAUZE STRIP,3% BISMUTH TRIBROMOPHENATE IN PETROLATUM BLEND: Brand: XEROFORM

## (undated) DEVICE — NEEDLE 18 G X 1 1/2

## (undated) DEVICE — STRETCH BANDAGE: Brand: CURITY

## (undated) DEVICE — SYRINGE 5ML LL

## (undated) DEVICE — SUT VICRYL 3-0 SH 27 IN J416H

## (undated) DEVICE — GLOVE SRG BIOGEL 7

## (undated) DEVICE — SUT VICRYL 2-0 SH 27 IN UNDYED J417H

## (undated) DEVICE — SCD SEQUENTIAL COMPRESSION COMFORT SLEEVE MEDIUM KNEE LENGTH: Brand: KENDALL SCD

## (undated) DEVICE — SUT ETHILON 4-0 PS-2 18 IN 1667H

## (undated) DEVICE — BIPOLAR CORD DISP

## (undated) DEVICE — STOCKINETTE REGULAR

## (undated) DEVICE — PAD CAST 4 IN COTTON NON STERILE

## (undated) DEVICE — 10FR FRAZIER SUCTION HANDLE: Brand: CARDINAL HEALTH

## (undated) DEVICE — CURITY NON-ADHERENT STRIPS: Brand: CURITY

## (undated) DEVICE — REM POLYHESIVE ADULT PATIENT RETURN ELECTRODE: Brand: VALLEYLAB

## (undated) DEVICE — GAUZE SPONGES,USP TYPE VII GAUZE, 12 PLY: Brand: CURITY

## (undated) DEVICE — PREP PAD BNS: Brand: CONVERTORS